# Patient Record
Sex: MALE | Race: WHITE | Employment: OTHER | URBAN - METROPOLITAN AREA
[De-identification: names, ages, dates, MRNs, and addresses within clinical notes are randomized per-mention and may not be internally consistent; named-entity substitution may affect disease eponyms.]

---

## 2021-01-01 ENCOUNTER — HOSPITAL ENCOUNTER (INPATIENT)
Age: 46
LOS: 4 days | DRG: 720 | End: 2021-04-24
Attending: EMERGENCY MEDICINE | Admitting: INTERNAL MEDICINE
Payer: COMMERCIAL

## 2021-01-01 ENCOUNTER — APPOINTMENT (OUTPATIENT)
Dept: CT IMAGING | Age: 46
DRG: 720 | End: 2021-01-01
Payer: COMMERCIAL

## 2021-01-01 ENCOUNTER — APPOINTMENT (OUTPATIENT)
Dept: NUCLEAR MEDICINE | Age: 46
DRG: 720 | End: 2021-01-01
Payer: COMMERCIAL

## 2021-01-01 ENCOUNTER — APPOINTMENT (OUTPATIENT)
Dept: GENERAL RADIOLOGY | Age: 46
DRG: 720 | End: 2021-01-01
Payer: COMMERCIAL

## 2021-01-01 VITALS
SYSTOLIC BLOOD PRESSURE: 117 MMHG | BODY MASS INDEX: 25.22 KG/M2 | TEMPERATURE: 97.5 F | DIASTOLIC BLOOD PRESSURE: 79 MMHG | OXYGEN SATURATION: 96 % | WEIGHT: 202.8 LBS | HEIGHT: 75 IN | RESPIRATION RATE: 18 BRPM | HEART RATE: 78 BPM

## 2021-01-01 DIAGNOSIS — R79.89 ELEVATED D-DIMER: ICD-10-CM

## 2021-01-01 DIAGNOSIS — N17.9 AKI (ACUTE KIDNEY INJURY) (HCC): ICD-10-CM

## 2021-01-01 DIAGNOSIS — J18.9 PNEUMONIA DUE TO ORGANISM: Primary | ICD-10-CM

## 2021-01-01 LAB
ABO/RH: NORMAL
ACINETOBACTER BAUMANNII BY PCR: NOT DETECTED
ALBUMIN SERPL-MCNC: 2 G/DL (ref 3.5–5.2)
ALBUMIN SERPL-MCNC: 2.4 G/DL (ref 3.5–5.2)
ALBUMIN SERPL-MCNC: 2.6 G/DL (ref 3.5–5.2)
ALBUMIN SERPL-MCNC: 2.7 G/DL (ref 3.5–5.2)
ALBUMIN SERPL-MCNC: 2.8 G/DL (ref 3.5–5.2)
ALP BLD-CCNC: 118 U/L (ref 40–129)
ALT SERPL-CCNC: 10 U/L (ref 0–40)
AMMONIA: 24 UMOL/L (ref 16–60)
AMPHETAMINE SCREEN, URINE: NOT DETECTED
ANION GAP SERPL CALCULATED.3IONS-SCNC: 16 MMOL/L (ref 7–16)
ANION GAP SERPL CALCULATED.3IONS-SCNC: 17 MMOL/L (ref 7–16)
ANTIBODY SCREEN: NORMAL
AST SERPL-CCNC: 19 U/L (ref 0–39)
B.E.: -22.9 MMOL/L (ref -3–0)
B.E.: -9.2 MMOL/L (ref -3–3)
BARBITURATE SCREEN URINE: NOT DETECTED
BASOPHILS ABSOLUTE: 0.05 E9/L (ref 0–0.2)
BASOPHILS ABSOLUTE: 0.05 E9/L (ref 0–0.2)
BASOPHILS ABSOLUTE: 0.06 E9/L (ref 0–0.2)
BASOPHILS ABSOLUTE: 0.06 E9/L (ref 0–0.2)
BASOPHILS ABSOLUTE: 0.07 E9/L (ref 0–0.2)
BASOPHILS RELATIVE PERCENT: 0.3 % (ref 0–2)
BASOPHILS RELATIVE PERCENT: 0.3 % (ref 0–2)
BASOPHILS RELATIVE PERCENT: 0.4 % (ref 0–2)
BASOPHILS RELATIVE PERCENT: 0.5 % (ref 0–2)
BASOPHILS RELATIVE PERCENT: 0.5 % (ref 0–2)
BENZODIAZEPINE SCREEN, URINE: NOT DETECTED
BILIRUB SERPL-MCNC: 0.5 MG/DL (ref 0–1.2)
BLOOD BANK DISPENSE STATUS: NORMAL
BLOOD BANK PRODUCT CODE: NORMAL
BOTTLE TYPE: ABNORMAL
BPU ID: NORMAL
BUN BLDV-MCNC: 75 MG/DL (ref 6–20)
BUN BLDV-MCNC: 75 MG/DL (ref 6–20)
BUN BLDV-MCNC: 79 MG/DL (ref 6–20)
BUN BLDV-MCNC: 80 MG/DL (ref 6–20)
BUN BLDV-MCNC: 81 MG/DL (ref 6–20)
BUN BLDV-MCNC: 85 MG/DL (ref 6–20)
BUN BLDV-MCNC: 87 MG/DL (ref 6–20)
BUN BLDV-MCNC: 95 MG/DL (ref 6–20)
CALCIUM SERPL-MCNC: 7.2 MG/DL (ref 8.6–10.2)
CALCIUM SERPL-MCNC: 8.4 MG/DL (ref 8.6–10.2)
CALCIUM SERPL-MCNC: 8.5 MG/DL (ref 8.6–10.2)
CALCIUM SERPL-MCNC: 8.5 MG/DL (ref 8.6–10.2)
CALCIUM SERPL-MCNC: 8.8 MG/DL (ref 8.6–10.2)
CALCIUM SERPL-MCNC: 8.8 MG/DL (ref 8.6–10.2)
CANDIDA ALBICANS BY PCR: NOT DETECTED
CANDIDA GLABRATA BY PCR: NOT DETECTED
CANDIDA KRUSEI BY PCR: NOT DETECTED
CANDIDA PARAPSILOSIS BY PCR: NOT DETECTED
CANDIDA TROPICALIS BY PCR: NOT DETECTED
CANNABINOID SCREEN URINE: NOT DETECTED
CHLORIDE BLD-SCNC: 100 MMOL/L (ref 98–107)
CHLORIDE BLD-SCNC: 101 MMOL/L (ref 98–107)
CHLORIDE BLD-SCNC: 102 MMOL/L (ref 98–107)
CHLORIDE BLD-SCNC: 102 MMOL/L (ref 98–107)
CHLORIDE BLD-SCNC: 103 MMOL/L (ref 98–107)
CHLORIDE BLD-SCNC: 106 MMOL/L (ref 98–107)
CHLORIDE URINE RANDOM: 31 MMOL/L
CHOLESTEROL, TOTAL: 103 MG/DL (ref 0–199)
CO2: 11 MMOL/L (ref 22–29)
CO2: 12 MMOL/L (ref 22–29)
CO2: 12 MMOL/L (ref 22–29)
CO2: 13 MMOL/L (ref 22–29)
CO2: 14 MMOL/L (ref 22–29)
CO2: 15 MMOL/L (ref 22–29)
CO2: 16 MMOL/L (ref 22–29)
CO2: 16 MMOL/L (ref 22–29)
COCAINE METABOLITE SCREEN URINE: NOT DETECTED
COHB: 1.5 % (ref 0–1.5)
CREAT SERPL-MCNC: 4.8 MG/DL (ref 0.7–1.2)
CREAT SERPL-MCNC: 4.8 MG/DL (ref 0.7–1.2)
CREAT SERPL-MCNC: 5.3 MG/DL (ref 0.7–1.2)
CREAT SERPL-MCNC: 5.3 MG/DL (ref 0.7–1.2)
CREAT SERPL-MCNC: 5.6 MG/DL (ref 0.7–1.2)
CREAT SERPL-MCNC: 5.8 MG/DL (ref 0.7–1.2)
CREAT SERPL-MCNC: 5.9 MG/DL (ref 0.7–1.2)
CREAT SERPL-MCNC: 6.5 MG/DL (ref 0.7–1.2)
CREATININE URINE: 88 MG/DL (ref 40–278)
CRITICAL NOTIFICATION: YES
CRITICAL: ABNORMAL
D DIMER: >5250 NG/ML DDU
DATE ANALYZED: ABNORMAL
DATE OF COLLECTION: ABNORMAL
DELIVERY SYSTEMS: ABNORMAL
DESCRIPTION BLOOD BANK: NORMAL
DEVICE: ABNORMAL
EKG ATRIAL RATE: 84 BPM
EKG Q-T INTERVAL: 328 MS
EKG QRS DURATION: 116 MS
EKG QTC CALCULATION (BAZETT): 455 MS
EKG R AXIS: -51 DEGREES
EKG T AXIS: 111 DEGREES
EKG VENTRICULAR RATE: 116 BPM
ENTEROBACTER CLOACAE COMPLEX BY PCR: NOT DETECTED
ENTEROBACTERALES BY PCR: NOT DETECTED
ENTEROCOCCUS BY PCR: NOT DETECTED
EOSINOPHILS ABSOLUTE: 0.14 E9/L (ref 0.05–0.5)
EOSINOPHILS ABSOLUTE: 0.22 E9/L (ref 0.05–0.5)
EOSINOPHILS ABSOLUTE: 0.25 E9/L (ref 0.05–0.5)
EOSINOPHILS ABSOLUTE: 0.28 E9/L (ref 0.05–0.5)
EOSINOPHILS ABSOLUTE: 0.3 E9/L (ref 0.05–0.5)
EOSINOPHILS RELATIVE PERCENT: 0.8 % (ref 0–6)
EOSINOPHILS RELATIVE PERCENT: 1.5 % (ref 0–6)
EOSINOPHILS RELATIVE PERCENT: 1.7 % (ref 0–6)
EOSINOPHILS RELATIVE PERCENT: 2 % (ref 0–6)
EOSINOPHILS RELATIVE PERCENT: 2.3 % (ref 0–6)
ESCHERICHIA COLI BY PCR: NOT DETECTED
FENTANYL SCREEN, URINE: NOT DETECTED
FERRITIN: 1015 NG/ML
FOLATE: 5.1 NG/ML (ref 4.8–24.2)
GFR AFRICAN AMERICAN: 11
GFR AFRICAN AMERICAN: 13
GFR AFRICAN AMERICAN: 14
GFR AFRICAN AMERICAN: 14
GFR AFRICAN AMERICAN: 16
GFR AFRICAN AMERICAN: 16
GFR NON-AFRICAN AMERICAN: 10 ML/MIN/1.73
GFR NON-AFRICAN AMERICAN: 11 ML/MIN/1.73
GFR NON-AFRICAN AMERICAN: 11 ML/MIN/1.73
GFR NON-AFRICAN AMERICAN: 12 ML/MIN/1.73
GFR NON-AFRICAN AMERICAN: 12 ML/MIN/1.73
GFR NON-AFRICAN AMERICAN: 13 ML/MIN/1.73
GFR NON-AFRICAN AMERICAN: 13 ML/MIN/1.73
GFR NON-AFRICAN AMERICAN: 9 ML/MIN/1.73
GLUCOSE BLD-MCNC: 101 MG/DL (ref 74–99)
GLUCOSE BLD-MCNC: 103 MG/DL (ref 74–99)
GLUCOSE BLD-MCNC: 106 MG/DL (ref 74–99)
GLUCOSE BLD-MCNC: 108 MG/DL (ref 74–99)
GLUCOSE BLD-MCNC: 109 MG/DL (ref 74–99)
GLUCOSE BLD-MCNC: 113 MG/DL (ref 74–99)
GLUCOSE BLD-MCNC: 98 MG/DL (ref 74–99)
GLUCOSE BLD-MCNC: 99 MG/DL (ref 74–99)
HAEMOPHILUS INFLUENZAE BY PCR: NOT DETECTED
HBA1C MFR BLD: 5.5 % (ref 4–5.6)
HCO3 ARTERIAL: 8.4 MMOL/L (ref 22–26)
HCO3: 14.3 MMOL/L (ref 22–26)
HCT (EST): 19 % (ref 37–54)
HCT VFR BLD CALC: 20.2 % (ref 37–54)
HCT VFR BLD CALC: 21.4 % (ref 37–54)
HCT VFR BLD CALC: 21.6 % (ref 37–54)
HCT VFR BLD CALC: 22.5 % (ref 37–54)
HCT VFR BLD CALC: 23.9 % (ref 37–54)
HDLC SERPL-MCNC: 22 MG/DL
HEMOGLOBIN: 6.5 G/DL (ref 12.5–16.5)
HEMOGLOBIN: 7 G/DL (ref 12.5–16.5)
HEMOGLOBIN: 7.5 G/DL (ref 12.5–16.5)
HGB, (EST): 6.5 G/DL (ref 12.5–15.5)
HHB: 23.6 % (ref 0–5)
IMMATURE GRANULOCYTES #: 0.12 E9/L
IMMATURE GRANULOCYTES #: 0.14 E9/L
IMMATURE GRANULOCYTES #: 0.15 E9/L
IMMATURE GRANULOCYTES %: 0.8 % (ref 0–5)
IMMATURE GRANULOCYTES %: 0.8 % (ref 0–5)
IMMATURE GRANULOCYTES %: 0.9 % (ref 0–5)
IMMATURE GRANULOCYTES %: 1 % (ref 0–5)
IMMATURE GRANULOCYTES %: 1.1 % (ref 0–5)
IRON SATURATION: 23 % (ref 20–55)
IRON SATURATION: 24 % (ref 20–55)
IRON: 37 MCG/DL (ref 59–158)
IRON: 39 MCG/DL (ref 59–158)
KLEBSIELLA OXYTOCA BY PCR: NOT DETECTED
KLEBSIELLA PNEUMONIAE GROUP BY PCR: NOT DETECTED
L. PNEUMOPHILA SEROGP 1 UR AG: NORMAL
L. PNEUMOPHILA SEROGP 1 UR AG: NORMAL
LAB: ABNORMAL
LACTIC ACID, SEPSIS: 0.6 MMOL/L (ref 0.5–1.9)
LDL CHOLESTEROL CALCULATED: 58 MG/DL (ref 0–99)
LISTERIA MONOCYTOGENES BY PCR: NOT DETECTED
LYMPHOCYTES ABSOLUTE: 1.29 E9/L (ref 1.5–4)
LYMPHOCYTES ABSOLUTE: 1.33 E9/L (ref 1.5–4)
LYMPHOCYTES ABSOLUTE: 1.42 E9/L (ref 1.5–4)
LYMPHOCYTES ABSOLUTE: 1.49 E9/L (ref 1.5–4)
LYMPHOCYTES ABSOLUTE: 1.52 E9/L (ref 1.5–4)
LYMPHOCYTES RELATIVE PERCENT: 10.7 % (ref 20–42)
LYMPHOCYTES RELATIVE PERCENT: 11 % (ref 20–42)
LYMPHOCYTES RELATIVE PERCENT: 8.8 % (ref 20–42)
LYMPHOCYTES RELATIVE PERCENT: 9 % (ref 20–42)
LYMPHOCYTES RELATIVE PERCENT: 9 % (ref 20–42)
Lab: ABNORMAL
Lab: NORMAL
MAGNESIUM: 1.4 MG/DL (ref 1.6–2.6)
MCH RBC QN AUTO: 27.8 PG (ref 26–35)
MCH RBC QN AUTO: 27.9 PG (ref 26–35)
MCH RBC QN AUTO: 28.5 PG (ref 26–35)
MCH RBC QN AUTO: 28.5 PG (ref 26–35)
MCH RBC QN AUTO: 28.8 PG (ref 26–35)
MCHC RBC AUTO-ENTMCNC: 31.1 % (ref 32–34.5)
MCHC RBC AUTO-ENTMCNC: 31.4 % (ref 32–34.5)
MCHC RBC AUTO-ENTMCNC: 32.2 % (ref 32–34.5)
MCHC RBC AUTO-ENTMCNC: 32.4 % (ref 32–34.5)
MCHC RBC AUTO-ENTMCNC: 32.7 % (ref 32–34.5)
MCV RBC AUTO: 86.1 FL (ref 80–99.9)
MCV RBC AUTO: 88.1 FL (ref 80–99.9)
MCV RBC AUTO: 88.5 FL (ref 80–99.9)
MCV RBC AUTO: 88.6 FL (ref 80–99.9)
MCV RBC AUTO: 91.5 FL (ref 80–99.9)
METHADONE SCREEN, URINE: NOT DETECTED
METHB: 0.8 % (ref 0–1.5)
MODE: ABNORMAL
MONOCYTES ABSOLUTE: 0.99 E9/L (ref 0.1–0.95)
MONOCYTES ABSOLUTE: 1.1 E9/L (ref 0.1–0.95)
MONOCYTES ABSOLUTE: 1.11 E9/L (ref 0.1–0.95)
MONOCYTES ABSOLUTE: 1.16 E9/L (ref 0.1–0.95)
MONOCYTES ABSOLUTE: 1.16 E9/L (ref 0.1–0.95)
MONOCYTES RELATIVE PERCENT: 6.8 % (ref 2–12)
MONOCYTES RELATIVE PERCENT: 6.9 % (ref 2–12)
MONOCYTES RELATIVE PERCENT: 7.4 % (ref 2–12)
MONOCYTES RELATIVE PERCENT: 8.2 % (ref 2–12)
MONOCYTES RELATIVE PERCENT: 8.6 % (ref 2–12)
NEISSERIA MENINGITIDIS BY PCR: NOT DETECTED
NEUTROPHILS ABSOLUTE: 11.06 E9/L (ref 1.8–7.3)
NEUTROPHILS ABSOLUTE: 11.6 E9/L (ref 1.8–7.3)
NEUTROPHILS ABSOLUTE: 11.95 E9/L (ref 1.8–7.3)
NEUTROPHILS ABSOLUTE: 14.01 E9/L (ref 1.8–7.3)
NEUTROPHILS ABSOLUTE: 9.92 E9/L (ref 1.8–7.3)
NEUTROPHILS RELATIVE PERCENT: 76.5 % (ref 43–80)
NEUTROPHILS RELATIVE PERCENT: 77.9 % (ref 43–80)
NEUTROPHILS RELATIVE PERCENT: 80.5 % (ref 43–80)
NEUTROPHILS RELATIVE PERCENT: 81.3 % (ref 43–80)
NEUTROPHILS RELATIVE PERCENT: 82.5 % (ref 43–80)
O2 CONTENT: 8 ML/DL
O2 SATURATION: 75.8 % (ref 92–98.5)
O2 SATURATION: 89.4 % (ref 92–98.5)
O2HB: 74.1 % (ref 94–97)
OPERATOR ID: 7991
OPERATOR ID: ABNORMAL
OPIATE SCREEN URINE: NOT DETECTED
ORDER NUMBER: ABNORMAL
ORGANISM: ABNORMAL
ORGANISM: ABNORMAL
OXYCODONE URINE: NOT DETECTED
PATIENT TEMP: 37
PATIENT TEMP: 37 C
PCO2 (TEMP CORRECTED): 45 MMHG (ref 35–45)
PCO2: 23.1 MMHG (ref 35–45)
PDW BLD-RTO: 15 FL (ref 11.5–15)
PDW BLD-RTO: 15.1 FL (ref 11.5–15)
PDW BLD-RTO: 15.3 FL (ref 11.5–15)
PDW BLD-RTO: 15.8 FL (ref 11.5–15)
PDW BLD-RTO: 16 FL (ref 11.5–15)
PH (TEMPERATURE CORRECTED): 6.88 (ref 7.35–7.45)
PH BLOOD GAS: 7.41 (ref 7.35–7.45)
PHENCYCLIDINE SCREEN URINE: NOT DETECTED
PHOSPHORUS: 6.3 MG/DL (ref 2.5–4.5)
PHOSPHORUS: 6.3 MG/DL (ref 2.5–4.5)
PHOSPHORUS: 6.4 MG/DL (ref 2.5–4.5)
PHOSPHORUS: 6.5 MG/DL (ref 2.5–4.5)
PLATELET # BLD: 126 E9/L (ref 130–450)
PLATELET # BLD: 140 E9/L (ref 130–450)
PLATELET # BLD: 141 E9/L (ref 130–450)
PLATELET # BLD: 165 E9/L (ref 130–450)
PLATELET # BLD: 169 E9/L (ref 130–450)
PMV BLD AUTO: 10 FL (ref 7–12)
PMV BLD AUTO: 10.1 FL (ref 7–12)
PMV BLD AUTO: 10.3 FL (ref 7–12)
PMV BLD AUTO: 10.5 FL (ref 7–12)
PMV BLD AUTO: 11.2 FL (ref 7–12)
PO2 (TEMP CORRECTED): 97.1 MMHG (ref 60–100)
PO2: 41.9 MMHG (ref 75–100)
POTASSIUM SERPL-SCNC: 3.8 MMOL/L (ref 3.5–5)
POTASSIUM SERPL-SCNC: 3.9 MMOL/L (ref 3.5–5)
POTASSIUM SERPL-SCNC: 4 MMOL/L (ref 3.5–5)
POTASSIUM SERPL-SCNC: 4.1 MMOL/L (ref 3.5–5)
POTASSIUM SERPL-SCNC: 4.2 MMOL/L (ref 3.5–5)
POTASSIUM, UR: 26.4 MMOL/L
PRO-BNP: ABNORMAL PG/ML (ref 0–125)
PROCALCITONIN: 1.19 NG/ML (ref 0–0.08)
PROTEUS SPECIES BY PCR: NOT DETECTED
PSEUDOMONAS AERUGINOSA BY PCR: NOT DETECTED
RBC # BLD: 2.28 E12/L (ref 3.8–5.8)
RBC # BLD: 2.43 E12/L (ref 3.8–5.8)
RBC # BLD: 2.46 E12/L (ref 3.8–5.8)
RBC # BLD: 2.51 E12/L (ref 3.8–5.8)
RBC # BLD: 2.7 E12/L (ref 3.8–5.8)
SARS-COV-2, NAAT: NOT DETECTED
SERRATIA MARCESCENS BY PCR: NOT DETECTED
SODIUM BLD-SCNC: 129 MMOL/L (ref 132–146)
SODIUM BLD-SCNC: 130 MMOL/L (ref 132–146)
SODIUM BLD-SCNC: 131 MMOL/L (ref 132–146)
SODIUM BLD-SCNC: 131 MMOL/L (ref 132–146)
SODIUM BLD-SCNC: 132 MMOL/L (ref 132–146)
SODIUM BLD-SCNC: 133 MMOL/L (ref 132–146)
SODIUM URINE: 41 MMOL/L
SOURCE OF BLOOD CULTURE: ABNORMAL
SOURCE, BLOOD GAS: ABNORMAL
SOURCE, BLOOD GAS: ABNORMAL
STAPHYLOCOCCUS AUREUS BY PCR: NOT DETECTED
STAPHYLOCOCCUS SPECIES BY PCR: NOT DETECTED
STREP PNEUMONIAE ANTIGEN, URINE: NORMAL
STREP PNEUMONIAE ANTIGEN, URINE: NORMAL
STREPTOCOCCUS AGALACTIAE BY PCR: NOT DETECTED
STREPTOCOCCUS PNEUMONIAE BY PCR: NOT DETECTED
STREPTOCOCCUS PYOGENES  BY PCR: NOT DETECTED
STREPTOCOCCUS SPECIES BY PCR: DETECTED
THB: 7.6 G/DL (ref 11.5–16.5)
TIME ANALYZED: 641
TOTAL IRON BINDING CAPACITY: 163 MCG/DL (ref 250–450)
TOTAL IRON BINDING CAPACITY: 163 MCG/DL (ref 250–450)
TOTAL PROTEIN: 7.1 G/DL (ref 6.4–8.3)
TRIGL SERPL-MCNC: 113 MG/DL (ref 0–149)
TROPONIN: 0.03 NG/ML (ref 0–0.03)
TROPONIN: 0.05 NG/ML (ref 0–0.03)
TROPONIN: 0.05 NG/ML (ref 0–0.03)
TROPONIN: 0.06 NG/ML (ref 0–0.03)
TROPONIN: 0.07 NG/ML (ref 0–0.03)
VITAMIN B-12: 629 PG/ML (ref 211–946)
VLDLC SERPL CALC-MCNC: 23 MG/DL
WBC # BLD: 13 E9/L (ref 4.5–11.5)
WBC # BLD: 14.2 E9/L (ref 4.5–11.5)
WBC # BLD: 14.3 E9/L (ref 4.5–11.5)
WBC # BLD: 14.8 E9/L (ref 4.5–11.5)
WBC # BLD: 17 E9/L (ref 4.5–11.5)

## 2021-01-01 PROCEDURE — 83605 ASSAY OF LACTIC ACID: CPT

## 2021-01-01 PROCEDURE — 6370000000 HC RX 637 (ALT 250 FOR IP): Performed by: INTERNAL MEDICINE

## 2021-01-01 PROCEDURE — 2500000003 HC RX 250 WO HCPCS: Performed by: STUDENT IN AN ORGANIZED HEALTH CARE EDUCATION/TRAINING PROGRAM

## 2021-01-01 PROCEDURE — 86923 COMPATIBILITY TEST ELECTRIC: CPT

## 2021-01-01 PROCEDURE — 2580000003 HC RX 258: Performed by: INTERNAL MEDICINE

## 2021-01-01 PROCEDURE — 99232 SBSQ HOSP IP/OBS MODERATE 35: CPT | Performed by: NURSE PRACTITIONER

## 2021-01-01 PROCEDURE — 6360000002 HC RX W HCPCS: Performed by: INTERNAL MEDICINE

## 2021-01-01 PROCEDURE — 6370000000 HC RX 637 (ALT 250 FOR IP): Performed by: NURSE PRACTITIONER

## 2021-01-01 PROCEDURE — 70450 CT HEAD/BRAIN W/O DYE: CPT

## 2021-01-01 PROCEDURE — 82728 ASSAY OF FERRITIN: CPT

## 2021-01-01 PROCEDURE — 94002 VENT MGMT INPAT INIT DAY: CPT

## 2021-01-01 PROCEDURE — 85025 COMPLETE CBC W/AUTO DIFF WBC: CPT

## 2021-01-01 PROCEDURE — 83036 HEMOGLOBIN GLYCOSYLATED A1C: CPT

## 2021-01-01 PROCEDURE — 94664 DEMO&/EVAL PT USE INHALER: CPT

## 2021-01-01 PROCEDURE — 99284 EMERGENCY DEPT VISIT MOD MDM: CPT

## 2021-01-01 PROCEDURE — 80048 BASIC METABOLIC PNL TOTAL CA: CPT

## 2021-01-01 PROCEDURE — 2060000000 HC ICU INTERMEDIATE R&B

## 2021-01-01 PROCEDURE — 80307 DRUG TEST PRSMV CHEM ANLYZR: CPT

## 2021-01-01 PROCEDURE — 74176 CT ABD & PELVIS W/O CONTRAST: CPT

## 2021-01-01 PROCEDURE — 2580000003 HC RX 258: Performed by: FAMILY MEDICINE

## 2021-01-01 PROCEDURE — 36430 TRANSFUSION BLD/BLD COMPNT: CPT

## 2021-01-01 PROCEDURE — 83550 IRON BINDING TEST: CPT

## 2021-01-01 PROCEDURE — 82805 BLOOD GASES W/O2 SATURATION: CPT

## 2021-01-01 PROCEDURE — 82436 ASSAY OF URINE CHLORIDE: CPT

## 2021-01-01 PROCEDURE — 82803 BLOOD GASES ANY COMBINATION: CPT

## 2021-01-01 PROCEDURE — 36415 COLL VENOUS BLD VENIPUNCTURE: CPT

## 2021-01-01 PROCEDURE — A9539 TC99M PENTETATE: HCPCS | Performed by: RADIOLOGY

## 2021-01-01 PROCEDURE — 86901 BLOOD TYPING SEROLOGIC RH(D): CPT

## 2021-01-01 PROCEDURE — 6370000000 HC RX 637 (ALT 250 FOR IP): Performed by: FAMILY MEDICINE

## 2021-01-01 PROCEDURE — 6360000002 HC RX W HCPCS: Performed by: FAMILY MEDICINE

## 2021-01-01 PROCEDURE — 2140000000 HC CCU INTERMEDIATE R&B

## 2021-01-01 PROCEDURE — 84484 ASSAY OF TROPONIN QUANT: CPT

## 2021-01-01 PROCEDURE — 80069 RENAL FUNCTION PANEL: CPT

## 2021-01-01 PROCEDURE — 83880 ASSAY OF NATRIURETIC PEPTIDE: CPT

## 2021-01-01 PROCEDURE — 99221 1ST HOSP IP/OBS SF/LOW 40: CPT | Performed by: PSYCHIATRY & NEUROLOGY

## 2021-01-01 PROCEDURE — 83735 ASSAY OF MAGNESIUM: CPT

## 2021-01-01 PROCEDURE — 87635 SARS-COV-2 COVID-19 AMP PRB: CPT

## 2021-01-01 PROCEDURE — 2580000003 HC RX 258: Performed by: STUDENT IN AN ORGANIZED HEALTH CARE EDUCATION/TRAINING PROGRAM

## 2021-01-01 PROCEDURE — 82140 ASSAY OF AMMONIA: CPT

## 2021-01-01 PROCEDURE — 87449 NOS EACH ORGANISM AG IA: CPT

## 2021-01-01 PROCEDURE — P9016 RBC LEUKOCYTES REDUCED: HCPCS

## 2021-01-01 PROCEDURE — 93010 ELECTROCARDIOGRAM REPORT: CPT | Performed by: INTERNAL MEDICINE

## 2021-01-01 PROCEDURE — A9540 TC99M MAA: HCPCS | Performed by: RADIOLOGY

## 2021-01-01 PROCEDURE — 6360000002 HC RX W HCPCS: Performed by: STUDENT IN AN ORGANIZED HEALTH CARE EDUCATION/TRAINING PROGRAM

## 2021-01-01 PROCEDURE — 2500000003 HC RX 250 WO HCPCS: Performed by: INTERNAL MEDICINE

## 2021-01-01 PROCEDURE — 87150 DNA/RNA AMPLIFIED PROBE: CPT

## 2021-01-01 PROCEDURE — 2500000003 HC RX 250 WO HCPCS

## 2021-01-01 PROCEDURE — 87077 CULTURE AEROBIC IDENTIFY: CPT

## 2021-01-01 PROCEDURE — 87040 BLOOD CULTURE FOR BACTERIA: CPT

## 2021-01-01 PROCEDURE — 84133 ASSAY OF URINE POTASSIUM: CPT

## 2021-01-01 PROCEDURE — 6360000002 HC RX W HCPCS

## 2021-01-01 PROCEDURE — 78582 LUNG VENTILAT&PERFUS IMAGING: CPT | Performed by: RADIOLOGY

## 2021-01-01 PROCEDURE — 86900 BLOOD TYPING SEROLOGIC ABO: CPT

## 2021-01-01 PROCEDURE — 87186 SC STD MICRODIL/AGAR DIL: CPT

## 2021-01-01 PROCEDURE — 83540 ASSAY OF IRON: CPT

## 2021-01-01 PROCEDURE — 82607 VITAMIN B-12: CPT

## 2021-01-01 PROCEDURE — 86850 RBC ANTIBODY SCREEN: CPT

## 2021-01-01 PROCEDURE — 93005 ELECTROCARDIOGRAM TRACING: CPT | Performed by: STUDENT IN AN ORGANIZED HEALTH CARE EDUCATION/TRAINING PROGRAM

## 2021-01-01 PROCEDURE — 71045 X-RAY EXAM CHEST 1 VIEW: CPT

## 2021-01-01 PROCEDURE — 84300 ASSAY OF URINE SODIUM: CPT

## 2021-01-01 PROCEDURE — 80053 COMPREHEN METABOLIC PANEL: CPT

## 2021-01-01 PROCEDURE — 85378 FIBRIN DEGRADE SEMIQUANT: CPT

## 2021-01-01 PROCEDURE — 78582 LUNG VENTILAT&PERFUS IMAGING: CPT

## 2021-01-01 PROCEDURE — 80061 LIPID PANEL: CPT

## 2021-01-01 PROCEDURE — 82570 ASSAY OF URINE CREATININE: CPT

## 2021-01-01 PROCEDURE — 3430000000 HC RX DIAGNOSTIC RADIOPHARMACEUTICAL: Performed by: RADIOLOGY

## 2021-01-01 PROCEDURE — 2580000003 HC RX 258

## 2021-01-01 PROCEDURE — 82746 ASSAY OF FOLIC ACID SERUM: CPT

## 2021-01-01 PROCEDURE — 84145 PROCALCITONIN (PCT): CPT

## 2021-01-01 PROCEDURE — 71046 X-RAY EXAM CHEST 2 VIEWS: CPT

## 2021-01-01 RX ORDER — LORAZEPAM 1 MG/1
1 TABLET ORAL EVERY 4 HOURS PRN
Status: DISCONTINUED | OUTPATIENT
Start: 2021-01-01 | End: 2021-04-25 | Stop reason: HOSPADM

## 2021-01-01 RX ORDER — ACETAMINOPHEN 325 MG/1
650 TABLET ORAL EVERY 6 HOURS PRN
Status: DISCONTINUED | OUTPATIENT
Start: 2021-01-01 | End: 2021-04-25 | Stop reason: HOSPADM

## 2021-01-01 RX ORDER — LORAZEPAM 2 MG/ML
1 INJECTION INTRAMUSCULAR ONCE
Status: COMPLETED | OUTPATIENT
Start: 2021-01-01 | End: 2021-01-01

## 2021-01-01 RX ORDER — EPINEPHRINE 0.1 MG/ML
SYRINGE (ML) INJECTION
Status: DISCONTINUED
Start: 2021-01-01 | End: 2021-01-01 | Stop reason: WASHOUT

## 2021-01-01 RX ORDER — SODIUM CHLORIDE 9 MG/ML
INJECTION, SOLUTION INTRAVENOUS CONTINUOUS
Status: DISCONTINUED | OUTPATIENT
Start: 2021-01-01 | End: 2021-01-01

## 2021-01-01 RX ORDER — KIT FOR THE PREPARATION OF TECHNETIUM TC 99M PENTETATE 20 MG/1
800 INJECTION, POWDER, LYOPHILIZED, FOR SOLUTION INTRAVENOUS; RESPIRATORY (INHALATION)
Status: COMPLETED | OUTPATIENT
Start: 2021-01-01 | End: 2021-01-01

## 2021-01-01 RX ORDER — SODIUM BICARBONATE 650 MG/1
1300 TABLET ORAL 2 TIMES DAILY
Status: DISCONTINUED | OUTPATIENT
Start: 2021-01-01 | End: 2021-04-25 | Stop reason: HOSPADM

## 2021-01-01 RX ORDER — SODIUM CHLORIDE 9 MG/ML
25 INJECTION, SOLUTION INTRAVENOUS PRN
Status: DISCONTINUED | OUTPATIENT
Start: 2021-01-01 | End: 2021-04-25 | Stop reason: HOSPADM

## 2021-01-01 RX ORDER — SODIUM CHLORIDE 0.9 % (FLUSH) 0.9 %
5-40 SYRINGE (ML) INJECTION PRN
Status: DISCONTINUED | OUTPATIENT
Start: 2021-01-01 | End: 2021-01-01 | Stop reason: SDUPTHER

## 2021-01-01 RX ORDER — GUAIFENESIN 400 MG/1
400 TABLET ORAL 3 TIMES DAILY
Status: DISCONTINUED | OUTPATIENT
Start: 2021-01-01 | End: 2021-04-25 | Stop reason: HOSPADM

## 2021-01-01 RX ORDER — IPRATROPIUM BROMIDE AND ALBUTEROL SULFATE 2.5; .5 MG/3ML; MG/3ML
1 SOLUTION RESPIRATORY (INHALATION)
Status: DISCONTINUED | OUTPATIENT
Start: 2021-01-01 | End: 2021-01-01

## 2021-01-01 RX ORDER — HYDROXYZINE PAMOATE 25 MG/1
25 CAPSULE ORAL 3 TIMES DAILY PRN
Status: DISCONTINUED | OUTPATIENT
Start: 2021-01-01 | End: 2021-04-25 | Stop reason: HOSPADM

## 2021-01-01 RX ORDER — LORAZEPAM 0.5 MG/1
0.5 TABLET ORAL EVERY 4 HOURS PRN
Status: DISCONTINUED | OUTPATIENT
Start: 2021-01-01 | End: 2021-01-01

## 2021-01-01 RX ORDER — LIDOCAINE HYDROCHLORIDE 10 MG/ML
5 INJECTION, SOLUTION EPIDURAL; INFILTRATION; INTRACAUDAL; PERINEURAL ONCE
Status: DISCONTINUED | OUTPATIENT
Start: 2021-01-01 | End: 2021-04-25 | Stop reason: HOSPADM

## 2021-01-01 RX ORDER — SODIUM CHLORIDE 9 MG/ML
INJECTION, SOLUTION INTRAVENOUS PRN
Status: DISCONTINUED | OUTPATIENT
Start: 2021-01-01 | End: 2021-04-25 | Stop reason: HOSPADM

## 2021-01-01 RX ORDER — SODIUM CHLORIDE 0.9 % (FLUSH) 0.9 %
5-40 SYRINGE (ML) INJECTION PRN
Status: DISCONTINUED | OUTPATIENT
Start: 2021-01-01 | End: 2021-04-25 | Stop reason: HOSPADM

## 2021-01-01 RX ORDER — SODIUM CHLORIDE 0.9 % (FLUSH) 0.9 %
5-40 SYRINGE (ML) INJECTION EVERY 12 HOURS SCHEDULED
Status: DISCONTINUED | OUTPATIENT
Start: 2021-01-01 | End: 2021-04-25 | Stop reason: HOSPADM

## 2021-01-01 RX ORDER — SODIUM CHLORIDE 0.9 % (FLUSH) 0.9 %
5-40 SYRINGE (ML) INJECTION EVERY 12 HOURS SCHEDULED
Status: DISCONTINUED | OUTPATIENT
Start: 2021-01-01 | End: 2021-01-01 | Stop reason: SDUPTHER

## 2021-01-01 RX ADMIN — SODIUM BICARBONATE 1300 MG: 650 TABLET ORAL at 08:27

## 2021-01-01 RX ADMIN — SODIUM CHLORIDE: 9 INJECTION, SOLUTION INTRAVENOUS at 04:27

## 2021-01-01 RX ADMIN — CEFTRIAXONE SODIUM 1000 MG: 1 INJECTION, POWDER, FOR SOLUTION INTRAMUSCULAR; INTRAVENOUS at 16:44

## 2021-01-01 RX ADMIN — SODIUM CHLORIDE 100 ML/HR: 9 INJECTION, SOLUTION INTRAVENOUS at 23:24

## 2021-01-01 RX ADMIN — DOXYCYCLINE 100 MG: 100 INJECTION, POWDER, LYOPHILIZED, FOR SOLUTION INTRAVENOUS at 17:12

## 2021-01-01 RX ADMIN — ACETAMINOPHEN 650 MG: 325 TABLET ORAL at 19:36

## 2021-01-01 RX ADMIN — SODIUM BICARBONATE 1300 MG: 650 TABLET ORAL at 09:17

## 2021-01-01 RX ADMIN — SODIUM CHLORIDE, PRESERVATIVE FREE 10 ML: 5 INJECTION INTRAVENOUS at 21:09

## 2021-01-01 RX ADMIN — HYDROXYZINE PAMOATE 25 MG: 25 CAPSULE ORAL at 17:20

## 2021-01-01 RX ADMIN — SODIUM BICARBONATE 1300 MG: 650 TABLET ORAL at 21:40

## 2021-01-01 RX ADMIN — SODIUM CHLORIDE: 9 INJECTION, SOLUTION INTRAVENOUS at 14:13

## 2021-01-01 RX ADMIN — LORAZEPAM 0.5 MG: 0.5 TABLET ORAL at 02:05

## 2021-01-01 RX ADMIN — CEFTRIAXONE SODIUM 1000 MG: 1 INJECTION, POWDER, FOR SOLUTION INTRAMUSCULAR; INTRAVENOUS at 18:19

## 2021-01-01 RX ADMIN — SODIUM BICARBONATE 1300 MG: 650 TABLET ORAL at 08:44

## 2021-01-01 RX ADMIN — KIT FOR THE PREPARATION OF TECHNETIUM TC 99M PENTETATE 800 MICRO CURIE: 20 INJECTION, POWDER, LYOPHILIZED, FOR SOLUTION INTRAVENOUS; RESPIRATORY (INHALATION) at 16:14

## 2021-01-01 RX ADMIN — Medication 6.3 MILLICURIE: at 16:34

## 2021-01-01 RX ADMIN — SODIUM CHLORIDE, PRESERVATIVE FREE 10 ML: 5 INJECTION INTRAVENOUS at 08:27

## 2021-01-01 RX ADMIN — LORAZEPAM 1 MG: 2 INJECTION INTRAMUSCULAR; INTRAVENOUS at 14:21

## 2021-01-01 RX ADMIN — LORAZEPAM 0.5 MG: 0.5 TABLET ORAL at 21:47

## 2021-01-01 RX ADMIN — DAPTOMYCIN 500 MG: 500 INJECTION, POWDER, LYOPHILIZED, FOR SOLUTION INTRAVENOUS at 15:54

## 2021-01-01 RX ADMIN — CEFTRIAXONE SODIUM 1000 MG: 1 INJECTION, POWDER, FOR SOLUTION INTRAMUSCULAR; INTRAVENOUS at 17:11

## 2021-01-01 RX ADMIN — GUAIFENESIN 400 MG: 400 TABLET, FILM COATED ORAL at 16:44

## 2021-01-01 RX ADMIN — IPRATROPIUM BROMIDE AND ALBUTEROL SULFATE 1 AMPULE: .5; 3 SOLUTION RESPIRATORY (INHALATION) at 06:56

## 2021-01-01 RX ADMIN — SODIUM BICARBONATE 1300 MG: 650 TABLET ORAL at 21:09

## 2021-01-01 RX ADMIN — CEFTRIAXONE 1000 MG: 1 INJECTION, POWDER, FOR SOLUTION INTRAMUSCULAR; INTRAVENOUS at 18:12

## 2021-01-01 RX ADMIN — SODIUM BICARBONATE 1300 MG: 650 TABLET ORAL at 17:10

## 2021-01-01 RX ADMIN — DAPTOMYCIN 500 MG: 500 INJECTION, POWDER, LYOPHILIZED, FOR SOLUTION INTRAVENOUS at 16:44

## 2021-01-01 RX ADMIN — SODIUM CHLORIDE, PRESERVATIVE FREE 10 ML: 5 INJECTION INTRAVENOUS at 21:19

## 2021-01-01 RX ADMIN — SODIUM BICARBONATE: 84 INJECTION, SOLUTION INTRAVENOUS at 01:45

## 2021-01-01 RX ADMIN — DOXYCYCLINE 100 MG: 100 INJECTION, POWDER, LYOPHILIZED, FOR SOLUTION INTRAVENOUS at 18:13

## 2021-01-01 RX ADMIN — DOXYCYCLINE 100 MG: 100 INJECTION, POWDER, LYOPHILIZED, FOR SOLUTION INTRAVENOUS at 04:41

## 2021-01-01 RX ADMIN — GUAIFENESIN 400 MG: 400 TABLET, FILM COATED ORAL at 11:19

## 2021-01-01 RX ADMIN — SODIUM CHLORIDE: 9 INJECTION, SOLUTION INTRAVENOUS at 21:40

## 2021-01-01 RX ADMIN — GUAIFENESIN 400 MG: 400 TABLET, FILM COATED ORAL at 22:49

## 2021-01-01 RX ADMIN — SODIUM BICARBONATE 1300 MG: 650 TABLET ORAL at 22:49

## 2021-01-01 RX ADMIN — GUAIFENESIN 400 MG: 400 TABLET, FILM COATED ORAL at 09:17

## 2021-01-01 RX ADMIN — DOXYCYCLINE 100 MG: 100 INJECTION, POWDER, LYOPHILIZED, FOR SOLUTION INTRAVENOUS at 05:04

## 2021-01-01 RX ADMIN — EPOETIN ALFA-EPBX 3000 UNITS: 3000 INJECTION, SOLUTION INTRAVENOUS; SUBCUTANEOUS at 08:27

## 2021-01-01 RX ADMIN — HYDROXYZINE PAMOATE 25 MG: 25 CAPSULE ORAL at 11:19

## 2021-01-01 RX ADMIN — SODIUM BICARBONATE: 84 INJECTION, SOLUTION INTRAVENOUS at 13:00

## 2021-01-01 ASSESSMENT — PAIN SCALES - GENERAL
PAINLEVEL_OUTOF10: 0
PAINLEVEL_OUTOF10: 7

## 2021-01-01 ASSESSMENT — ENCOUNTER SYMPTOMS
VOMITING: 1
CHEST TIGHTNESS: 0
ABDOMINAL PAIN: 0
SHORTNESS OF BREATH: 1
WHEEZING: 0
EYE REDNESS: 0
COUGH: 0
PHOTOPHOBIA: 0
NAUSEA: 1
RHINORRHEA: 0
SORE THROAT: 0

## 2021-01-01 ASSESSMENT — PAIN DESCRIPTION - PAIN TYPE: TYPE: ACUTE PAIN

## 2021-01-01 ASSESSMENT — PAIN DESCRIPTION - LOCATION
LOCATION: CHEST
LOCATION: RIB CAGE

## 2021-04-20 PROBLEM — J18.9 PNEUMONIA: Status: ACTIVE | Noted: 2021-01-01

## 2021-04-20 NOTE — ED PROVIDER NOTES
Patient is a 79-year-old male with a history of multisubstance abuse, hepatic cirrhosis, MI, pacemaker placement, mitral valve surgery that presents emergency department for evaluation of chest pain. Patient states that the pain started 2 days ago on the right side of the chest.  It is a sharp stabbing sensation worse with deep inspiration. Nothing seems to make it better. He was given aspirin in route to the hospital with no improvement of pain. It has been intermittent and severe. Admits to mild shortness of breath. Denies fever, chills, cough, congestion, sore throat, abdominal pain, change in bowel habits, change in urinary habits. Denies any recent surgeries, long distance travel. He has never been treated for or diagnosed with cancer in the past.  No history of blood clots in legs or lungs. The history is provided by the patient. Review of Systems   Constitutional: Positive for appetite change, chills and fatigue. Negative for activity change and fever. HENT: Negative for congestion, rhinorrhea and sore throat. Eyes: Negative for photophobia, redness and visual disturbance. Respiratory: Positive for shortness of breath. Negative for cough, chest tightness and wheezing. Cardiovascular: Positive for chest pain. Negative for palpitations and leg swelling. Gastrointestinal: Positive for nausea and vomiting. Negative for abdominal pain. Genitourinary: Negative for decreased urine volume, dysuria, frequency and urgency. Musculoskeletal: Negative for myalgias, neck pain and neck stiffness. Skin: Negative for pallor and rash. Neurological: Positive for light-headedness. Negative for dizziness, weakness and headaches. Physical Exam  Constitutional:       General: He is not in acute distress. Appearance: Normal appearance. He is ill-appearing. He is not diaphoretic. HENT:      Head: Normocephalic and atraumatic.       Mouth/Throat:      Mouth: Mucous membranes are moist.   Eyes:      Extraocular Movements: Extraocular movements intact. Pupils: Pupils are equal, round, and reactive to light. Cardiovascular:      Rate and Rhythm: Regular rhythm. Tachycardia present. Pulses: Normal pulses. Heart sounds: Murmur present. Pulmonary:      Effort: Pulmonary effort is normal.      Breath sounds: Normal breath sounds. No wheezing or rhonchi. Chest:      Chest wall: Tenderness present. Comments: Midline surgical scar appears well-healed without any erythema or induration. Abdominal:      Palpations: Abdomen is soft. Tenderness: There is no abdominal tenderness. There is no guarding or rebound. Lymphadenopathy:      Cervical: No cervical adenopathy. Skin:     General: Skin is warm and dry. Neurological:      Mental Status: He is alert and oriented to person, place, and time. Procedures     MDM  Number of Diagnoses or Management Options  HEATHER (acute kidney injury) ([facility] [address]  Elevated d-dimer  Pneumonia due to organism  Diagnosis management comments: Patient is a 67-year-old male that presents emergency room for evaluation of chest pain. Patient mildly diaphoretic on presentation and uncomfortable however in no obvious distress. Vital signs are stable. Chest x-ray shows a right-sided pneumonia. Blood work remarkable for markedly elevated D-dimer and a BNP of 15,000. Patient's oxygen saturation normal on room air and he is not tachycardic given patient's acute kidney injury he is not a candidate to get a CTA of the chest at this time. V/Q scan ordered. Discussed case with hospitalist who will follow up on the V/Q scan. We will hold off on anticoagulation at this time as patient has a history of HIT. Patient given Rocephin and doxycycline for community-acquired pneumonia. Admitted in stable condition.        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 Past Surgical History:  has a past surgical history that includes ECHO Compl W Dop Color Flow (3/15/2013); ECHO Transesophageal (3/18/2013); ECHO Compl W Dop Color Flow (7/3/2013); ECHO Compl W Dop Color Flow (1/20/2014); ECHO Transesophageal (1/21/2014); and Cardiac surgery. Social History:  reports that he has been smoking cigarettes. He has been smoking about 1.00 pack per day. He has quit using smokeless tobacco. He reports current drug use. Drug: Marijuana. He reports that he does not drink alcohol. Family History: family history is not on file. The patients home medications have been reviewed.     Allergies: Coumadin [warfarin] and Heparin    -------------------------------------------------- RESULTS -------------------------------------------------    LABS:  Results for orders placed or performed during the hospital encounter of 04/20/21   COVID-19, Rapid    Specimen: Nasopharyngeal Swab   Result Value Ref Range    SARS-CoV-2, NAAT Not Detected Not Detected   CBC Auto Differential   Result Value Ref Range    WBC 17.0 (H) 4.5 - 11.5 E9/L    RBC 2.70 (L) 3.80 - 5.80 E12/L    Hemoglobin 7.5 (L) 12.5 - 16.5 g/dL    Hematocrit 23.9 (L) 37.0 - 54.0 %    MCV 88.5 80.0 - 99.9 fL    MCH 27.8 26.0 - 35.0 pg    MCHC 31.4 (L) 32.0 - 34.5 %    RDW 15.1 (H) 11.5 - 15.0 fL    Platelets 539 (L) 314 - 450 E9/L    MPV 11.2 7.0 - 12.0 fL    Neutrophils % 82.5 (H) 43.0 - 80.0 %    Immature Granulocytes % 0.8 0.0 - 5.0 %    Lymphocytes % 8.8 (L) 20.0 - 42.0 %    Monocytes % 6.8 2.0 - 12.0 %    Eosinophils % 0.8 0.0 - 6.0 %    Basophils % 0.3 0.0 - 2.0 %    Neutrophils Absolute 14.01 (H) 1.80 - 7.30 E9/L    Immature Granulocytes # 0.14 E9/L    Lymphocytes Absolute 1.49 (L) 1.50 - 4.00 E9/L    Monocytes Absolute 1.16 (H) 0.10 - 0.95 E9/L    Eosinophils Absolute 0.14 0.05 - 0.50 E9/L    Basophils Absolute 0.05 0.00 - 0.20 E9/L   Comprehensive Metabolic Panel   Result Value Ref Range    Sodium 133 132 - 146 mmol/L    Potassium 4.2 3.5 - 5.0 mmol/L    Chloride 106 98 - 107 mmol/L    CO2 11 (L) 22 - 29 mmol/L    Anion Gap 16 7 - 16 mmol/L    Glucose 98 74 - 99 mg/dL    BUN 80 (H) 6 - 20 mg/dL    CREATININE 5.6 (H) 0.7 - 1.2 mg/dL    GFR Non-African American 11 >=60 mL/min/1.73    GFR African American 13     Calcium 7.2 (L) 8.6 - 10.2 mg/dL    Total Protein 7.1 6.4 - 8.3 g/dL    Albumin 2.0 (L) 3.5 - 5.2 g/dL    Total Bilirubin 0.5 0.0 - 1.2 mg/dL    Alkaline Phosphatase 118 40 - 129 U/L    ALT 10 0 - 40 U/L    AST 19 0 - 39 U/L   Troponin   Result Value Ref Range    Troponin 0.03 0.00 - 0.03 ng/mL   D-Dimer, Quantitative   Result Value Ref Range    D-Dimer, Quant >5250 ng/mL DDU   Brain Natriuretic Peptide   Result Value Ref Range    Pro-BNP 15,608 (H) 0 - 125 pg/mL   Lactate, Sepsis   Result Value Ref Range    Lactic Acid, Sepsis 0.6 0.5 - 1.9 mmol/L   EKG 12 Lead   Result Value Ref Range    Ventricular Rate 116 BPM    Atrial Rate 84 BPM    QRS Duration 116 ms    Q-T Interval 328 ms    QTc Calculation (Bazett) 455 ms    R Axis -51 degrees    T Axis 111 degrees       RADIOLOGY:  XR CHEST PORTABLE   Final Result   Right lung pneumonia. Mild bilateral pleural effusion. NM LUNG VENT/PERFUSION (VQ)    (Results Pending)       EKG: This EKG is signed and interpreted by me.     Rate: 116  Rhythm: Sinus  Interpretation: Sinus tachycardia with a left axis deviation and incomplete left bundle branch block, QTc of 455  Comparison: stable as compared to patient's most recent EKG      ------------------------- NURSING NOTES AND VITALS REVIEWED ---------------------------  Date / Time Roomed:  4/20/2021  3:22 PM  ED Bed Assignment:  [id number]    The nursing notes within the ED encounter and vital signs as below have been reviewed. Patient Vitals for the past 24 hrs:   BP Temp Temp src Pulse Resp SpO2 Height Weight   04/20/21 2001 (!) 102/57 97.6 °F (36.4 °C) -- 80 -- 99 % 6' 3\" (1.905 m) 202 lb 12.8 oz (92 kg)   04/20/21 1834 99/69 -- -- 78 18 96 % -- --   04/20/21 1815 97/66 -- -- 74 20 97 % -- --   04/20/21 1528 97/63 97.4 °F (36.3 °C) Infrared 71 20 99 % 6' 3\" (1.905 m) 245 lb (111.1 kg)       Oxygen Saturation Interpretation: Normal    ------------------------------------------ PROGRESS NOTES ------------------------------------------  Re-evaluation(s):  Time: 9009  Patients symptoms show no change  Repeat physical examination is not changed    Counseling:  I have spoken with the patient and discussed todays results, in addition to providing specific details for the plan of care and counseling regarding the diagnosis and prognosis. Their questions are answered at this time and they are agreeable with the plan of admission.    --------------------------------- ADDITIONAL PROVIDER NOTES ---------------------------------  Consultations:  Spoke with Dr. Jaylen Alcaraz. Discussed case. They will admit the patient. This patient's ED course included: a personal history and physicial examination, re-evaluation prior to disposition, IV medications, cardiac monitoring and continuous pulse oximetry    This patient has remained hemodynamically stable during their ED course. Diagnosis:  1. Pneumonia due to organism    2. HEATHER (acute kidney injury) ([facility] [address]    3. Elevated d-dimer        Disposition:  Patient's disposition: Admit to telemetry  Patient's condition is stable.               Moe Luna., DO  Resident  04/20/21 5899

## 2021-04-20 NOTE — ED NOTES
Bed: 09  Expected date:   Expected time:   Means of arrival:   Comments:  emt     Jenniffer Solorio RN  04/20/21 9085

## 2021-04-20 NOTE — ED NOTES
Spoke with Dr Darryl Gavin ok to have VQ tomorrow since nucmed has left for day     Castro Cobb RN  04/20/21 0067

## 2021-04-21 PROBLEM — F19.10 POLYSUBSTANCE ABUSE (HCC): Status: ACTIVE | Noted: 2021-01-01

## 2021-04-21 PROBLEM — I95.9 HYPOTENSION: Status: ACTIVE | Noted: 2021-01-01

## 2021-04-21 PROBLEM — I38 VALVULAR HEART DISEASE: Status: ACTIVE | Noted: 2021-01-01

## 2021-04-21 PROBLEM — R79.89 ELEVATED D-DIMER: Status: ACTIVE | Noted: 2021-01-01

## 2021-04-21 PROBLEM — Z86.79 HISTORY OF ENDOCARDITIS: Status: ACTIVE | Noted: 2021-01-01

## 2021-04-21 PROBLEM — F17.200 TOBACCO DEPENDENCE: Status: ACTIVE | Noted: 2021-01-01

## 2021-04-21 PROBLEM — R29.898 LEFT ARM WEAKNESS: Status: ACTIVE | Noted: 2021-01-01

## 2021-04-21 PROBLEM — E87.20 METABOLIC ACIDOSIS: Status: ACTIVE | Noted: 2021-01-01

## 2021-04-21 PROBLEM — D69.6 THROMBOCYTOPENIA (HCC): Status: ACTIVE | Noted: 2021-01-01

## 2021-04-21 PROBLEM — R23.3 PETECHIAL RASH: Status: ACTIVE | Noted: 2021-01-01

## 2021-04-21 PROBLEM — M79.10 MYALGIA: Status: ACTIVE | Noted: 2021-01-01

## 2021-04-21 PROBLEM — Z78.9 PROBLEM WITH VASCULAR ACCESS: Status: ACTIVE | Noted: 2021-01-01

## 2021-04-21 PROBLEM — E83.51 HYPOCALCEMIA: Status: ACTIVE | Noted: 2021-01-01

## 2021-04-21 PROBLEM — D64.9 ANEMIA: Status: ACTIVE | Noted: 2021-01-01

## 2021-04-21 PROBLEM — Z87.898 HISTORY OF SEIZURE: Status: ACTIVE | Noted: 2021-01-01

## 2021-04-21 NOTE — CONSULTS
Reason for consultation: Left upper extremity weakness    HPI: Patient is a 79-year-old male with past medical history of CVA (right parietal, left corpus callosum, left cerebellar hemisphere in 2013 due to infective endocarditis), CAD/MI, infective endocarditis s/p MVR, pacemaker implantation, liver cirrhosis (due to HBV infection), IV drug abuse, HIT. Patient presented to ED 4/20/2021 with complaints of pleuritic chest pain, shortness of breath. He also complains of left upper extremity weakness over the past 2 days. He denies any numbness or tingling of the extremity, blurry vision, loss of vision, diplopia, dysarthria, gait instability or seizure activity. He does complain of bilateral frontal headache, which began earlier this morning. On arrival to ED, patient was hemodynamically stable, afebrile. Labs significant for acidosis with bicarb of 15, uremia with BUN of 95, elevated creatinine 6.5, leukocytosis with WBCs 17,000, anemia with hemoglobin 7.5, thrombocytopenia platelet count 386C. CT of the head demonstrating questionable left parietal SAH, however was negative for acute infarct or mass. ROS: Full ROS reviewed and negative except as mentioned above.     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 ([facility] [address] 1/5/2016    Heroin abuse ([facility] [address]     clean since 2012    MI (myocardial infarction) ([address]     Seizures ([facility] [address]     Substance abuse ([facility] [address]     Unspecified cerebral artery occlusion with cerebral infarction        PAST SURGICAL HISTORY:      Procedure Laterality Date    CARDIAC SURGERY      ECHO COMPL W DOP COLOR FLOW  3/15/2013         ECHO COMPL W DOP COLOR FLOW  7/3/2013         ECHO COMPL W DOP COLOR FLOW  1/20/2014         ECHOCARDIOGRAM TRANSESOPHAGEAL  3/18/2013         ECHOCARDIOGRAM TRANSESOPHAGEAL  1/21/2014            ALLERGIES:  Coumadin [warfarin] and Heparin      Home Medications Reviewed:         PHYSICAL EXAMINATION  Vitals   Vitals:    04/20/21 1815 04/20/21 1834 04/20/21 2001 04/21/21 0245   BP: 97/66 99/69 (!) 102/57 (!) 120/58   Pulse: 74 78 80 78   Resp: 20 18 18 16   Temp:   97.6 °F (36.4 °C) 98.8 °F (37.1 °C)   TempSrc:   Oral Temporal   SpO2: 97% 96% 99%    Weight:   202 lb 12.8 oz (92 kg)    Height:   6' 3\" (1.905 m)         General: Patient appears in no acute distress with a normal body habitus  HEENT: Normocephalic, atraumatic  Chest: Clear to auscultation bilaterally  Heart: Regular rate and rhythm, no murmurs appreciated  Extremities: No edema or cyanosis noted  Integument: Diffuse petechial rash noted    Neurologic Examination    Mental Status  Alert, and oriented to person, place and time with normal speech and language. No evidence of aphasia during conversation. No evidence of memory impairment. Attention and concentration appeared normal.     Cranial Nerves  II. Visual fields full to confrontation bilaterally. III, IV, VI: Pupils equally round and reactive to light, 3 to 2 mm bilaterally. EOMs: full, no nystagmus. V. Facial sensation intact to light touch bilaterally  VII: Facial movements symmetric and strong  VIII: Hearing intact to voice  IX,X: Palate elevates symmetrically. No dysarthria  XI: Sternocleidomastoid and trapezius 5/5 bilaterally   XII: Tongue is midline    Motor     Right Left   Right Left   Deltoid 5 5  Hip Flexion 5 5   Biceps      5  5  Knee Extension 5 5   Triceps 5 5  Knee Flexion 5 5   Handgrip 5 4-  Ankle Dorsiflexion 5 5       Ankle Plantarflexion 5 5     Tone: Normal in all four limbs    Bulk: Normal in all four limbs with no evidence of atrophy    Drift noted left upper extremity. Sensation  · Light Touch: Intact distally in all four limbs    Reflexes     Right Left   Biceps 2 2   Brachioradialis 2 2   Triceps 2 2   Patellar 2 2   Achilles 2 2   ankle clonus none none     Toes down going bilaterally.     Coordination  Rapid alternating movements normal in bilateral upper extremities  Finger to nose testing normal bilaterally  Heel to shin testing normal bilaterally      IMAGING:  CT ABDOMEN PELVIS WO CONTRAST Additional Contrast? None   Final Result   Limited evaluation without IV contrast.      Gallstone in the gallbladder with pericholecystic fat stranding and   gallbladder wall thickening may indicate acute cholecystitis. Other etiology   may include edema or infiltrative hepatic disease. Please correlate physical   exam.  Further evaluation may be obtained with HIDA scan there is concern   cholecystitis. No renal or ureteral calculus. Small bilateral pleural effusions with stable infiltrates, which may indicate   a chronic process such as scarring. CT HEAD WO CONTRAST   Final Result   Focal hyperattenuation in left parietal lobe may represent tiny subarachnoid   hemorrhage. No evidence of acute infarction. Encephalomalacia seen in right parietal lobe. XR CHEST PORTABLE   Final Result   Right lung pneumonia. Mild bilateral pleural effusion. NM LUNG VENT/PERFUSION (VQ)    (Results Pending)          ASSESSMENT:    58-year-old male with remote history of CVA due to infective endocarditis presenting with new focal neurologic deficit of the LUE concerning for recurrent thromboembolic event; questionable left parietal subarachnoid hemorrhage noted on CT head. PLAN:    · Obtain MRI brain if pacemaker compatible, otherwise recommend serial head CTs  · Would also consider LP if unable to obtain MRI to assess for xanthochromia, RBCs in CSF  · MRA head and neck ordered, defer CTA given renal dysfunction  · Echocardiogram to assess valves and R/O thrombus  · Consult neurosurgery for possible SAH  · Defer antiplatelet at this time given possible SAH and platelet dysfunction in setting of uremia, thrombocytopenia      Case discussed on rounds with Dr. Vicente Luna. Electronically signed by:  Estefanía Kelly Orozco MD, 4/21/2021 10:41 AM

## 2021-04-21 NOTE — CARE COORDINATION
4/21/2-021 - Care coordination - pt off unit at present time for testing.  SW/CM will follow up with pt in am.

## 2021-04-21 NOTE — PROGRESS NOTES
Hospitalist Progress Note      PCP: Renaldo Fong DO    Date of Admission: 4/20/2021  Days in the hospital: 1    Hospital Course:   14-year-old male patient with multiple medical problems including history of substance abuse, hepatitis B, liver cirrhosis, MRSA endocarditis, history of CVA, CAD who comes in with vague symptoms including some shortness of breath, nausea and vomiting and generalized feeling of not feeling well. He was noted to have pneumonia and sepsis at the time of admission started on empiric antibiotics. Chest x-ray did show right middle and lower lung consolidation. Patient also was noted to have acute kidney injury with creatinine of 6.5. Overnight patient had some left-sided weakness noted and CT of the head was done which showed left parietal lobe tiny subarachnoid hemorrhage. Consultation was placed to neurology. Patient also was noted to have pancytopenia. Subjective  Patient seen and examined at bedside. Denies any headache or dizziness. No change in vision noted. Complaining of decreased left hand . Awaiting neurology and nephrology evaluation. Exam:    BP (!) 120/58   Pulse 78   Temp 98.8 °F (37.1 °C) (Temporal)   Resp 16   Ht 6' 3\" (1.905 m)   Wt 202 lb 12.8 oz (92 kg)   SpO2 99%   BMI 25.35 kg/m²     HEENT: + Pallor, no icterus. Respiratory:  CTA, good air entry. Cardiovascular: RRR, no murmur. Abdomen: Soft, non-tender, BS noted. Musculoskeletal: No joint pains or joint swelling noted.    Neurologic: awake, alert and following commands       Assessment/Plan:  Active Hospital Problems    Diagnosis Date Noted    Polysubstance abuse [facility]) [F19.10] 04/21/2021    History of endocarditis [Z86.79] 04/21/2021    History of seizure [Z87.898] 04/21/2021    Tobacco dependence [F17.200] 04/21/2021    Myalgia [M79.10] 24/56/4589    Metabolic acidosis [R05.4] 04/21/2021    Thrombocytopenia (HCC) [D69.6] 04/21/2021    Petechial rash [R23.3] 04/21/2021  Anemia [D64.9] 04/21/2021    Problem with vascular access [Z78.9] 04/21/2021    Hypocalcemia [E83.51] 04/21/2021    Elevated d-dimer [R79.89] 04/21/2021    Valvular heart disease [I38] 04/21/2021    Hypotension [I95.9] 04/21/2021    Left arm weakness [R29.898] 04/21/2021    Pneumonia [J18.9] 04/20/2021    Hepatic cirrhosis due to hepatitis B ([facility] [address] [K74.60, B19.10] 01/05/2016    Acute kidney injury superimposed on CKD ([facility] [address] [N17.9, N18.9] 04/19/2014    Sepsis ([facility] [A41.9] 04/19/2014     · Subarachnoid hemorrhage    Plan:  · Continue empiric antibiotics for now, follow cultures  · Renal function worse today, follow-up with nephrology, patient having urine output, continue to monitor closely  · Neurology consulted, noted to have small subarachnoid hemorrhage  · Acute on chronic anemia, monitor him, may need transfusion  · Platelets improved today, continue to monitor labs  · Overall prognosis remains guarded      Labs:   Recent Labs     04/20/21  1606 04/21/21  0349   WBC 17.0* 14.3*   HGB 7.5* 7.0*   HCT 23.9* 21.6*   * 140     Recent Labs     04/20/21  1606 04/21/21  0349    131*   K 4.2 3.8    100   CO2 11* 15*   BUN 80* 95*   CREATININE 5.6* 6.5*   CALCIUM 7.2* 8.5*   PHOS  --  6.5*     Recent Labs     04/20/21  1606   AST 19   ALT 10   BILITOT 0.5   ALKPHOS 118     No results for input(s): INR in the last 72 hours.   Recent Labs     04/20/21  1606 04/21/21  0349   TROPONINI 0.03 0.05*       Medications:  Reviewed    Infusion Medications    sodium chloride      sodium chloride 125 mL/hr (04/21/21 0528)     Scheduled Medications    lidocaine PF  5 mL Intradermal Once    sodium chloride flush  5-40 mL Intravenous 2 times per day    sodium chloride flush  5-40 mL Intravenous 2 times per day    doxycycline (VIBRAMYCIN) IV  100 mg Intravenous Q12H    cefTRIAXone (ROCEPHIN) IV  1,000 mg Intravenous Q24H     PRN Meds: sodium chloride flush, sodium chloride, sodium chloride flush      Intake/Output Summary (Last 24 hours) at 4/21/2021 0906  Last data filed at 4/20/2021 2111  Gross per 24 hour   Intake 240 ml   Output 300 ml   Net -60 ml     · Body mass index is 25.35 kg/m². · Diet  · No diet orders on file    · Code Status  · Prior       Electronically signed by Anam Clifford MD on 4/21/2021 at 9:06 AM  [employer]   Please contact me through perfect [employer]    NOTE: This report was transcribed using voice recognition software. Every effort was made to ensure accuracy; however, inadvertent computerized transcription errors may be present.

## 2021-04-21 NOTE — PATIENT CARE CONFERENCE
P Quality Flow/Interdisciplinary Rounds Progress Note        Quality Flow Rounds held on April 21, 2021    Disciplines Attending:  Bedside Nurse, ,  and Nursing Unit Leadership    Naye Mata was admitted on 4/20/2021  3:22 PM    Anticipated Discharge Date:  Expected Discharge Date: 04/23/21    Disposition:    Domingo Score:  Domingo Scale Score: 23    Readmission Risk              Risk of Unplanned Readmission:        21           Discussed patient goal for the day, patient clinical progression, and barriers to discharge.   The following Goal(s) of the Day/Commitment(s) have been identified:  labs report results      Joselin Roger  April 21, 2021

## 2021-04-21 NOTE — CONSULTS
Nephrology Consult  [facility]  Mick Ewing. MD    CC:    Admitted with HEATHER, pneumonia and shortness of breath. HPI:   Patient is a 50-year-old male with a history of multisubstance abuse, hepatic cirrhosis, MI, pacemaker placement, mitral valve surgery that presents emergency department for evaluation of chest pain. Patient states that the pain started 2 days ago on the right side of the chest.  It is a sharp stabbing sensation worse with deep inspiration. Vital signs upon arrival included temperature 97.4, blood pressure 97/63, pulse 71, respirations 20 and 99% O2 saturation. Initial lab work revealed WBC 17.0, hemoglobin 7.5, and platelet count 083. Initial BMP revealed sodium 133, potassium 4.2, chloride 106, CO2 11, BUN 80 and creatinine 5.6. Anion gap 16.  proBNP 15,608. Chest x-ray revealed right lung pneumonia as well as mild bilateral pleural effusions. EKG showed sinus tachycardia with a rate of 116. It was felt the patient was not a good candidate for CT of the chest due to elevated serum creatinine. VQ scan ordered. He was started on IV antibiotics for pneumonia. Ultimately admitted with HEATHER, pneumonia and shortness of breath. Patient now examined sitting up in bed in no acute distress. He denies any chest pain currently but is visibly short of breath even with conversation. Currently no nausea or vomiting in fact he has a good appetite. He denies any problems with diarrhea. He states that over the past 48 hours his urine has become more concentrated. He denies any dysuria or hematuria. He also denies any known history of kidney disease and has not followed with nephrology in the past.  He denies any NSAID usage.     PMH:    Past Medical History:   Diagnosis Date    Bacterial endocarditis 2/2013    CAD (coronary artery disease)     [facility]atic cirrhosis [facility] to [facility] B ([facility] 1/5/2016    Heroin abuse ([facility]     clean since 2012    MI (myocardial infarction) ([facility]  Seizures ([facility]     Substance abuse ([facility]     Unspecified cerebral artery occlusion with cerebral infarction        Patient Active Problem List   Diagnosis    Altered mental status    Elevated troponin    Cocaine abuse ([facility]    Sepsis (HCC)    Subacute bacterial endocarditis    Sepsis ([facility]    Acute kidney injury superimposed on CKD (HCC)    Hepatic cirrhosis due to hepatitis B (HCC)    Ascites    Pneumonia    Polysubstance abuse ([facility]    History of endocarditis    History of seizure    Tobacco dependence    Myalgia    Metabolic acidosis    Thrombocytopenia (HCC)    Petechial rash    Anemia    Problem with vascular access    Hypocalcemia    Elevated d-dimer    Valvular heart disease    Hypotension    Left arm weakness       Meds:     lidocaine PF  5 mL Intradermal Once    sodium chloride flush  5-40 mL Intravenous 2 times per day    sodium chloride flush  5-40 mL Intravenous 2 times per day    doxycycline (VIBRAMYCIN) IV  100 mg Intravenous Q12H    cefTRIAXone (ROCEPHIN) IV  1,000 mg Intravenous Q24H        sodium chloride      sodium chloride 125 mL/hr (04/21/21 0528)       Meds prn:     sodium chloride flush, sodium chloride, sodium chloride flush    Meds prior to admission:     No current facility-administered medications on file prior to encounter. No current outpatient medications on file prior to encounter. Allergies:    Coumadin [warfarin] and Heparin    Social History:     reports that he has been smoking cigarettes. He has been smoking about 1.00 pack per day. He has quit using smokeless tobacco. He reports current drug use. Drug: Marijuana. He reports that he does not drink alcohol. Family History:     History reviewed. No pertinent family history.     ROS:     General: no fever, chills   Heent: no nasal congestion, sore throat   Resp: no cough, sob , hemoptysis  Cardiac: no cp , le edema, palpitations  Gi: no nausea, vomiting, melena, abd pain, hematemesis  Gu: no hematuria, dysuria   Neruo: no numbness, weakness, headache, blurry vision   Endocrine:  no h/o dm  Derm: no rash , petechia noted  Heme: no epistaxis, bruising  All other sx negative     Physical Exam:      Patient Vitals for the past 24 hrs:   BP Temp Temp src Pulse Resp SpO2 Height Weight   04/21/21 0245 (!) 120/58 98.8 °F (37.1 °C) Temporal 78 16 -- -- --   04/20/21 2001 (!) 102/57 97.6 °F (36.4 °C) Oral 80 18 99 % 6' 3\" (1.905 m) 202 lb 12.8 oz (92 kg)   04/20/21 1834 99/69 -- -- 78 18 96 % -- --   04/20/21 1815 97/66 -- -- 74 20 97 % -- --   04/20/21 1528 97/63 97.4 °F (36.3 °C) Infrared 71 20 99 % 6' 3\" (1.905 m) 245 lb (111.1 kg)         Intake/Output Summary (Last 24 hours) at 4/21/2021 1152  Last data filed at 4/21/2021 1025  Gross per 24 hour   Intake 480 ml   Output 300 ml   Net 180 ml       Constitutional: Patient in no acute distress   Head: normocephalic, atraumatic   Neck: supple, no jvd  Cardiovascular: regular rate and rhythm, no murmurs, gallops, or rubs   Respiratory: Clear, but diminished in both bases  Gastrointestinal: soft, nontender, nondistended, no hepatosplenomegaly  Ext: No lower extremity edema   Skin: dry, no rash   Back: nontender    Data:    Recent Labs     04/20/21  1606 04/21/21  0349   WBC 17.0* 14.3*   HGB 7.5* 7.0*   HCT 23.9* 21.6*   MCV 88.5 86.1   * 140       Recent Labs     04/20/21  1606 04/21/21  0349    131*   K 4.2 3.8    100   CO2 11* 15*   CREATININE 5.6* 6.5*   BUN 80* 95*   LABGLOM 11 9   GLUCOSE 98 113*   CALCIUM 7.2* 8.5*   PHOS  --  6.5*       No results found for: VITD25    No results found for: PTH    Recent Labs     04/20/21  1606   ALT 10   AST 19   ALKPHOS 118   BILITOT 0.5       Recent Labs     04/20/21  1606 04/21/21  0349   LABALBU 2.0* 2.8*       Ferritin   Date Value Ref Range Status   01/31/2014 448 ng/mL Final     Comment:     Reference Ranges-  Adult males 20 - 60 years-    30 - 400 ng/mL  Adult females 16 - 61 years-  13 - 150 ng/mL  Adults greater than 60 years- no established reference range  Pediatrics-                   no established reference range     Iron   Date Value Ref Range Status   04/21/2021 37 (L) 59 - 158 mcg/dL Final     TIBC   Date Value Ref Range Status   04/21/2021 163 (L) 250 - 450 mcg/dL Final       Vitamin B-12   Date Value Ref Range Status   04/21/2021 629 211 - 946 pg/mL Final       Folate   Date Value Ref Range Status   04/21/2021 5.1 4.8 - 24.2 ng/mL Final         Lab Results   Component Value Date    COLORU Yellow 01/19/2016    NITRU Negative 01/19/2016    GLUCOSEU Negative 01/19/2016    KETUA Negative 01/19/2016    UROBILINOGEN 0.2 01/19/2016    BILIRUBINUR Negative 01/19/2016       Lab Results   Component Value Date/Time    IDALIA 512 04/19/2014 10:35 PM       No components found for: URIC    No results found for: LIPIDPAN      Assessment and Plan:    1. Acute on chronic kidney disease  Baseline serum creatinine ranging 1.3 -> 1.5 since November 2014  Most recent serum creatinine 1.0 on 1/17/17  Now admitted with serum creatinine 5.6 in the setting of SOB w/ CP  CT imaging from 4/21/21 shows no evidence of hydronephrosis as well as no evidence of renal or ureteral calculus  Need urinalysis and urine electrolytes  Follow closely  Strict intake and output  Avoid NSAIDs  Daily labs including total CK  Continue ivf     2. Metabolic acidosis  Secondary to severe dehydration and renal failure  Already improving with IV fluids  We will add oral bicarbonate for now and follow closely     3. SOB  With elevated D-dimer  Await VQ scan results  Also started on IV antibiotics for pneumonia    4. Thrombocytopenia  In a patient with liver disease  Petechial rash noted  Further work-up per the primary team    5. Anemia  Follow closely  Transfuse as needed for hemoglobin less than 7.0    6.   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

## 2021-04-21 NOTE — H&P
Hospital Medicine History & Physical      PCP: Sabi Everett DO    Date of Admission: 4/20/2021    Date of Service: Pt seen/examined on 4/20/2021 and Admitted to Inpatient with expected LOS greater than two midnights due to medical therapy. Chief Complaint: Chest pain      History Of Present Illness:      54 y.o. male who presented to [address] [place] [address] with past medical history of substance abuse-cocaine/heroine, hepatitis B viral infection with liver cirrhosis, ascites status post paracentesis, seizures, coronary artery disease, CVA, recurrent MRSA infective endocarditis status post mitral valve replacement with bioprosthetic valve x3 and HIT. Last redo was in 2014 and mitral valve prosthetic valve leak closure, atrial fibrillation/bradycardia/pauses, has a pacemaker placed in 2014, and tobacco dependence. Patient is irritable, he has not been cooperating with nursing staff, states that he has been sick for a week, \"I feel like shit and everything hurts\". He reports shortness of breath, this is on and off, worse with exertion, associated with nausea and one episode of vomiting yesterday. He denies any cough or chest pain even though he was sent here for chest pain. He does not want to answer questions and tends to snap when asked any questions. He has his head covered in his bedsheet and would not engage. Rash noted on his extremities, states that they have been there for 2 weeks. He has very difficult vascular access. Very dark urine in urinal by bedside. Vital signs notable for respiratory rate of 20, blood pressure 97/63, labs showed D-dimer greater than 5250, proBNP 15,608, creatinine 5.6, last documented baseline was in 2017, this was 1, CO2 11, tested negative for Covid, calcium 7.2, white count 17, hemoglobin 7.5, platelet count 825, troponin 0 0.03. EKG showed acid to junctional rhythm rate of 116 with incomplete left bundle branch block, ST depression in 2, 3, aVF, V2 through V6.   Last documented echo was in 2014, EF was 55% with abnormal paradoxical motion, mitral valve vegetation, mild to moderate TR, mild AR and pulmonary hypertension. Chest x-ray tonight shows right lung pneumonia with small bilateral effusions. He is being admitted for further management. Past Medical History:          Diagnosis Date    Bacterial endocarditis 2/2013    CAD (coronary artery disease)     Hepatic cirrhosis due to [facility] B ([facility] 1/5/2016    [facility]in [facility] ([facility]     clean since 2012    MI [facility] infarction) [facility]     Seizures ([facility]     Substance abuse [facility]     Unspecified cerebral artery occlusion with cerebral infarction        Past Surgical History:          Procedure Laterality Date    CARDIAC SURGERY      ECHO COMPL W DOP COLOR FLOW  3/15/2013         ECHO COMPL W DOP COLOR FLOW  7/3/2013         ECHO COMPL W DOP COLOR FLOW  1/20/2014         ECHOCARDIOGRAM TRANSESOPHAGEAL  3/18/2013         ECHOCARDIOGRAM TRANSESOPHAGEAL  1/21/2014            Medications Prior to Admission:      Prior to Admission medications    Not on File       Allergies:  Coumadin [warfarin] and Heparin    Social History:      The patient currently lives home. TOBACCO:   reports that he has been smoking cigarettes. He has been smoking about 1.00 pack per day. He has quit using smokeless tobacco.  ETOH:   reports no history of alcohol use. Drugs: History of cocaine and heroin use. Denies any current usage. Family History:     Reviewed in detail Positive as follows:    History reviewed. No pertinent family history. REVIEW OF SYSTEMS:   Pertinent positives as noted in the HPI. All other systems reviewed and negative.     PHYSICAL EXAM:    BP (!) 102/57   Pulse 80   Temp 97.6 °F (36.4 °C) (Oral)   Resp 18   Ht 6' 3\" (1.905 m)   Wt 202 lb 12.8 oz (92 kg)   SpO2 99%   BMI 25.35 kg/m²     General appearance: Middle-age male, irritable, no apparent distress, appears stated age and uncooperative, afebrile. HEENT:  Normal cephalic, atraumatic without obvious deformity. Pupils equal, round, and reactive to light. Conjunctivae/corneas clear. Neck: Supple, with limited range of motion. No jugular venous distention. Trachea midline. Respiratory:  Normal respiratory effort. Clear to auscultation, bilaterally without Rales/Wheezes/Rhonchi. Cardiovascular:  Regular rate and rhythm with normal S1/S2 with murmur, no rubs or gallops. Abdomen: Soft, non-tender, non-distended with normal bowel sounds. Musculoskeletal:  No clubbing, cyanosis or edema bilaterally. Limited range of motion without deformity. Skin: Skin color, texture, turgor normal.  Diffuse petechia in his legs only  Neurologic:  grossly non-focal.  Psychiatric:  Alert and oriented, thought content appropriate, normal insight  Capillary Refill: Brisk,< 3 seconds   Peripheral Pulses: +2 palpable, equal bilaterally       Labs:     Recent Labs     04/20/21  1606   WBC 17.0*   HGB 7.5*   HCT 23.9*   *     Recent Labs     04/20/21  1606      K 4.2      CO2 11*   BUN 80*   CREATININE 5.6*   CALCIUM 7.2*     Recent Labs     04/20/21  1606   AST 19   ALT 10   BILITOT 0.5   ALKPHOS 118     No results for input(s): INR in the last 72 hours. Recent Labs     04/20/21  1606   TROPONINI 0.03       Urinalysis:      Lab Results   Component Value Date    NITRU Negative 01/19/2016    WBCUA NONE 01/19/2016    BACTERIA RARE 01/19/2016    RBCUA 0-1 01/19/2016    RBCUA 5-10 01/19/2014    BLOODU SMALL 01/19/2016    SPECGRAV 1.010 01/19/2016    GLUCOSEU Negative 01/19/2016       Radiology:   Reviewed and documented    XR CHEST PORTABLE   Final Result   Right lung pneumonia. Mild bilateral pleural effusion.          NM LUNG VENT/PERFUSION (VQ)    (Results Pending)       ASSESSMENT:    Active Hospital Problems    Diagnosis Date Noted    Polysubstance abuse [facility]) [F19.10] 04/21/2021    History of endocarditis [Z86.79] 04/21/2021    History of seizure [Z87.898] 04/21/2021    Tobacco dependence [F17.200] 04/21/2021    Myalgia [M79.10] 81/53/1299    Metabolic acidosis [W73.7] 04/21/2021    Thrombocytopenia (HCC) [D69.6] 04/21/2021    Petechial rash [R23.3] 04/21/2021    Anemia [D64.9] 04/21/2021    Problem with vascular access [Z78.9] 04/21/2021    Hypocalcemia [E83.51] 04/21/2021    Elevated d-dimer [R79.89] 04/21/2021    Valvular heart disease [I38] 04/21/2021    Hypotension [I95.9] 04/21/2021    Pneumonia [J18.9] 04/20/2021    Hepatic cirrhosis due to hepatitis B ([facility] [K74.60, B19.10] 01/05/2016    Acute kidney injury superimposed on CKD ([facility] [N17.9, N18.9] 04/19/2014    Sepsis ([facility] [A41.9] 04/19/2014         PLAN:  1. Pneumonia, IV Rocephin and doxy, urine Legionella antigen and streptococcal antigen, procalcitonin. 2.  Elevated D-dimer, suspicious for PE given initial presentation of chest pain, VQ scan has been ordered. Anticoagulation currently not started due to thrombocytopenia. 3.  Renal failure of unknown chronicity, likely acute on chronic kidney injury, patient is dehydrated with very concentrated urine, give fluids, monitor urinary output and kidney function. Nephrology consult if kidney function does not improve as expected, renal imaging to be obtained at that point. Obtain CPK level. 4.  Sepsis secondary to pneumonia. White count 17, heart rate is 116 on EKG. give fluids, blood cultures. 5.  Metabolic acidosis secondary to severe dehydration and renal failure. Check lactic acid level, give fluids. 6.  Thrombocytopenia in a patient with liver disease. Monitor platelet count. Patient has petechial rash. 7.  Petechial rash of extremities in a patient with low platelet count. ? History of HIT. monitor. Avoid anticoagulation. Check INR. 8.  Chest pain with abnormal EKG, patient with incomplete left bundle branch block. Cycle enzymes. Denies chest pain at the moment.   9.  Anemia, monitor hemoglobin, transfuse as needed if less than 7. Anemia work-up. 10.  Hypotension secondary to hypovolemia, renal failure and infection. IV fluids. Monitor vitals. 11.  Difficult vascular access, IV therapy in the morning. May need midline or PICC line. 12.  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 He has had mitral valve replaced with bioprosthetic valve multiple timesx2 in 2013, 3rd redo was in April 29, 2014. Has residual valvular heart disease involving the tricuspid valve. 13.  Myalgia, rule out rhabdomyolysis. 14.  Hepatitis B with liver cirrhosis, check ammonia level. HIV test  15. Hypocalcemia, check ionized calcium and correct as needed. 16.  Agitation and irritable behavior, check urine drug screen. 17.  History of seizure not on any medications  18. History of a.fib/bradycardia/pauses status post pacemaker in 2014. 19. Polysubstance abuse  20. Tobacco dependence      DVT Prophylaxis: SCDs, avoid anticoagulation due to low platelet count and petechia  rash  Diet: No diet orders on file renal.  Code Status: Prior full    PT/OT Eval Status: As needed    Dispo -inpatient/telemetry       Laine Richard MD    Thank you Al Marcano DO for the opportunity to be involved in this patient's care.

## 2021-04-21 NOTE — SIGNIFICANT EVENT
Reports left upper extremity weakness x 2 days. Reexamined at bedside. Has a drift of left arm. He was not cooperative with exam last night. Power left UE 2-3/5. Obtain STAT head CT. unable to get MRI. Has Pace maker. Neurology consult. Antiplatelet per neurology, has low platelets and petechiae.

## 2021-04-22 NOTE — PROGRESS NOTES
Patient is very argumentive over everything. states he doesn't know the doctors names of who put in his pacer or valve replacements. all he was able to tell me was that it was done at [facility].patient signed release of health info and this form was faxed to [address] [place] [address] for release of all health info. this was done to obtain info on pacer and valve replacements in preparation for mri of brain.

## 2021-04-22 NOTE — CARE COORDINATION
4/22/2021 - Care Coordination - admitted for pneumonia, LUE weakness. Hx pacemaker, mitral valve replacement, CVA,  hepatitis B, and substance abuse. Cardiology, neurology, nephrology, neurosurgery consults. Spoke with pt in room to discuss discharge planning. Has no PCP. Has no set pharmacy that he uses. Lives with his sister in her 2 story house with a flight of stairs to 2nd floor where his bed/bath are located. His mother lives across the street. Denies hx HHC. Reports he has been in several DEB over the years but unsure of which ones. DME - [facility]. Current plan is to discharge home when medically stable. His daughter will provide transportation home. SW/CM will follow.

## 2021-04-22 NOTE — PROGRESS NOTES
Hospitalist Progress Note      PCP: Shakir Elder DO    Date of Admission: 4/20/2021  Days in the hospital: 2    Hospital Course:   50-year-old male patient with multiple medical problems including history of substance abuse, hepatitis B, liver cirrhosis, MRSA endocarditis, history of CVA, CAD who comes in with vague symptoms including some shortness of breath, nausea and vomiting and generalized feeling of not feeling well. He was noted to have pneumonia and sepsis at the time of admission started on empiric antibiotics. Chest x-ray did show right middle and lower lung consolidation. Patient also was noted to have acute kidney injury with creatinine of 6.5. Overnight patient had some left-sided weakness noted and CT of the head was done which showed left parietal lobe tiny subarachnoid hemorrhage. Consultation was placed to neurology. Patient also was noted to have pancytopenia. Consultation was placed to neurosurgery and plan is for patient to have MRI.       Subjective  Patient seen and examined at bedside. Still having some left hand weakness, MRI pending, chart reviewed, overnight events reviewed. Denies any headache, change in vision. Neurosurgery following. Exam:    /68   Pulse 70   Temp 99.8 °F (37.7 °C) (Temporal)   Resp 16   Ht 6' 3\" (1.905 m)   Wt 202 lb 12.8 oz (92 kg)   SpO2 96%   BMI 25.35 kg/m²     HEENT: + Pallor, no icterus. Respiratory:  CTA, good air entry. Cardiovascular: RRR, no murmur. Abdomen: Soft, non-tender, BS noted. Musculoskeletal: No joint pains or joint swelling noted.    Neurologic: awake, alert and following commands       Assessment/Plan:  Active Hospital Problems    Diagnosis Date Noted    Polysubstance abuse [facility]) [F19.10] 04/21/2021    History of endocarditis [Z86.79] 04/21/2021    History of seizure [Z87.898] 04/21/2021    Tobacco dependence [F17.200] 04/21/2021    Myalgia [M79.10] 31/01/3939    Metabolic acidosis [O68.4] 04/21/2021 Intradermal Once    sodium chloride flush  5-40 mL Intravenous 2 times per day    sodium bicarbonate  1,300 mg Oral BID    sodium chloride flush  5-40 mL Intravenous 2 times per day    doxycycline (VIBRAMYCIN) IV  100 mg Intravenous Q12H    cefTRIAXone (ROCEPHIN) IV  1,000 mg Intravenous Q24H     PRN Meds: LORazepam, sodium chloride flush, sodium chloride, perflutren lipid microspheres, acetaminophen, sodium chloride flush      Intake/Output Summary (Last 24 hours) at 4/22/2021 0836  Last data filed at 4/22/2021 0551  Gross per 24 hour   Intake 480 ml   Output 850 ml   Net -370 ml     · Body mass index is 25.35 kg/m². · Diet  · DIET GENERAL;    · Code Status  · Prior       Electronically signed by Viktoria Troy MD on 4/22/2021 at 8:36 AM  [employer]   Please contact me through perfect serve    NOTE: This report was transcribed using voice recognition software. Every effort was made to ensure accuracy; however, inadvertent computerized transcription errors may be present.

## 2021-04-22 NOTE — PROGRESS NOTES
Roxy Sanchez is a 54 y.o. left handed male     Neurology following for LUE weakness    PMH of stroke (right parietal, left corpus callosum, left cerebellar in 2013 due to infective endocarditis. CAD/MI, infective endocarditis s/p MVR, pacemaker implantation, liver cirrhosis (due to HBV infection), IV drug abuse, HIT    Presented to ED with chest pain, shortness of breath and LUE weakness. No paresthesia in the LUE, no other neuro symptoms as well. CTH with questionable left parietal SAH but negative for acute infarct or mass. Patient is lying in bed watching TV. MRI brain and MRA neck is pending along with ECHO. He continues to have to LUE weakness. He notes claustrophobia. He has a history of strokes. 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       No chest pain or palpitations  No coughing or wheezing    No vertigo, lightheadedness or loss of consciousness  No falls, tripping or stumbling  No incontinence of bowels or bladder  No itching or bruising appreciated    ROS otherwise negative      Objective:     BP (!) 113/54   Pulse 78   Temp 98.6 °F (37 °C) (Temporal)   Resp 16   Ht 6' 3\" (1.905 m)   Wt 202 lb 12.8 oz (92 kg)   SpO2 99%   BMI 25.35 kg/m²     General appearance: alert, appears stated age, cooperative and no distress  Head: normocephalic, without obvious abnormality, atraumatic  Eyes: conjunctivae/corneas clear  Neck: supple, symmetrical, trachea midline   Lungs: respirations non labored but expiratory wheezing noted   Heart: regular rate and rhythm on monitor   Extremities:  normal, atraumatic, no cyanosis or edema  Skin: color, texture, turgor normal---no rashes or lesions      Mental Status: Alert, oriented, thought content appropriate     Appropriate attention/concentration  Intact fundus of knowledge  Repetition intact  Intact memories      Speech: no dysarthria   Language: no aphasias     Cranial Nerves:  I: smell NA   II: visual acuity  NA   II: visual fields Full to confrontation   II: pupils CHRIS   III,VII: ptosis None   III,IV,VI: extraocular muscles  Full ROM   V: mastication Normal   V: facial light touch sensation  Normal   V,VII: corneal reflex     VII: facial muscle function - upper  Normal   VII: facial muscle function - lower Normal   VIII: hearing Normal   IX: soft palate elevation  Normal   IX,X: gag reflex    XI: trapezius strength  5/5   XI: sternocleidomastoid strength 5/5   XI: neck extension strength  5/5   XII: tongue strength  Normal     Motor:  5/5 throughout except LUE 4/5 strength  Normal bulk and tone  No drift   No abnormal movements    Sensory:  LT and PP normal  Vibration normal    Coordination:   FN, FFM and PORFIRIO normal on the right and ataxic on left     DTR:   2+ throughout     No Babinskis    Laboratory/Radiology:     CBC with Differential:    Lab Results   Component Value Date    WBC 13.0 04/22/2021    RBC 2.43 04/22/2021    HGB 7.0 04/22/2021    HCT 21.4 04/22/2021     04/22/2021    MCV 88.1 04/22/2021    MCH 28.8 04/22/2021    MCHC 32.7 04/22/2021    RDW 15.3 04/22/2021    SEGSPCT 68 02/04/2014    BANDSPCT 1 05/06/2016    LYMPHOPCT 11.0 04/22/2021    MONOPCT 8.6 04/22/2021    BASOPCT 0.5 04/22/2021    MONOSABS 1.11 04/22/2021    LYMPHSABS 1.42 04/22/2021    EOSABS 0.30 04/22/2021    BASOSABS 0.06 04/22/2021     CMP:    Lab Results   Component Value Date     04/22/2021     04/22/2021    K 3.9 04/22/2021    K 3.9 04/22/2021     04/22/2021     04/22/2021    CO2 14 04/22/2021    CO2 13 04/22/2021    BUN 87 04/22/2021    BUN 85 04/22/2021    CREATININE 5.9 04/22/2021    CREATININE 5.8 04/22/2021    GFRAA 13 04/22/2021    GFRAA 13 04/22/2021    LABGLOM 10 04/22/2021    LABGLOM 11 04/22/2021    GLUCOSE 109 04/22/2021    GLUCOSE 108 04/22/2021    PROT 7.1 04/20/2021    LABALBU 2.7 04/22/2021    CALCIUM 8.8 04/22/2021    CALCIUM 8.8 04/22/2021    BILITOT 0.5 04/20/2021    ALKPHOS 118 04/20/2021    AST 19 04/20/2021    ALT 10 04/20/2021     Hepatic Function Panel:    Lab Results   Component Value Date    ALKPHOS 118 04/20/2021    ALT 10 04/20/2021    AST 19 04/20/2021    PROT 7.1 04/20/2021    BILITOT 0.5 04/20/2021    BILIDIR 0.5 01/20/2016    IBILI 0.6 01/20/2016    LABALBU 2.7 04/22/2021     CTH: Focal hyperattenuation in left parietal lobe may represent tiny subarachnoid  Hemorrhage. No evidence of acute infarction. Encephalomalacia seen in right parietal lobe.     All labs and imaging studies reviewed independently today     Assessment:     Concern for recurrent thromboembolic event with questionable SAH on [facility]  --- history of stroke due to infective endocarditis  --- LUE weakness only deficit  --- LDL and A1c needed  --- D dimer > 5000    Plan:     MRI brain and MRA head/neck ordered  May need to do [facility], CTA's head/neck if unable to do MRI  ECHO pending  LDL and A1c pending   NSGY following  Neurology to follow       JESSICA Magana, CNP  11:16 AM  4/22/2021

## 2021-04-22 NOTE — CONSULTS
Department of Internal Medicine  Infectious Diseases   Consult Note      Reason for Consult:  Strep bacteremia     Requesting Physician:  Dr Jamia Waterman:       This is a 54 yrs old male with hx of IVIDU ( 7 yrs ago ) , hx of recurrent MRSA endocarditis s/p MVR X 3  ( bioprosthetic ) ( 3/2013 , 7/2013, 4/2014 ) , permanent pacer insertion ( 5/2014) - hep B infection, liver cirrhosis, CAD, Stroke presented to the ER with syncope . Pt stated that his daughter called the EMS and was brought to the ER . He denied fever, chills, headache, chest pain . He reported shortness of breath, denied cough or sputum expectoration . He reported whole body ache . WBC was 17 K, pro BNP 15,608   Chest x ray - bilateral infiltrates , CT abdomen and pelvis - gall stone, pericholecystic fat stranding . He was started on IV rocephin and doxycycline .    Blood cx growing Streptococcus       Past Medical History:      Past Medical History:   Diagnosis Date    Bacterial endocarditis 2/2013    CAD (coronary artery disease)     Hepatic cirrhosis due to hepatitis B ([address] 1/5/2016    Heroin abuse ([address]     clean since 2012    MI (myocardial infarction) ([address]     Seizures ([address]     Substance abuse ([address]     Unspecified cerebral artery occlusion with cerebral infarction          Past Surgical History:      Past Surgical History:   Procedure Laterality Date    CARDIAC SURGERY      ECHO COMPL W DOP COLOR FLOW  3/15/2013         ECHO COMPL W DOP COLOR FLOW  7/3/2013         ECHO COMPL W DOP COLOR FLOW  1/20/2014         ECHOCARDIOGRAM TRANSESOPHAGEAL  3/18/2013         ECHOCARDIOGRAM TRANSESOPHAGEAL  1/21/2014            Current Medications:      Current Facility-Administered Medications   Medication Dose Route Frequency Provider Last Rate Last Admin    LORazepam (ATIVAN) tablet 0.5 mg  0.5 mg Oral Q4H PRN Selina Arce MD   0.5 mg at 04/22/21 0205    [START ON 4/23/2021] epoetin kranthi-epbx (RETACRIT) injection 3,000 Units  3,000 Units Subcutaneous Once per day on Mon Wed Fri Domingo Booker MD        lidocaine PF 1 % injection 5 mL  5 mL Intradermal Once Madhavi Lambert MD        sodium chloride flush 0.9 % injection 5-40 mL  5-40 mL Intravenous 2 times per day Madhavi Lambert MD   10 mL at 04/21/21 2109    sodium chloride flush 0.9 % injection 5-40 mL  5-40 mL Intravenous PRN Madhavi Lambert MD        0.9 % sodium chloride infusion  25 mL Intravenous PRN Madhavi Lambert MD        perflutren lipid microspheres (DEFINITY) injection 1.65 mg  1.5 mL Intravenous ONCE PRN Gigi Slaughter MD        sodium bicarbonate tablet 1,300 mg  1,300 mg Oral BID Kathrin Olszewski, APRN - CNP   1,300 mg at 04/22/21 0844    acetaminophen (TYLENOL) tablet 650 mg  650 mg Oral Q6H PRN Denis Marie MD   650 mg at 04/21/21 1936    sodium chloride flush 0.9 % injection 5-40 mL  5-40 mL Intravenous 2 times per day Ray Cook., DO   10 mL at 04/20/21 2119    sodium chloride flush 0.9 % injection 5-40 mL  5-40 mL Intravenous PRN Ray Cook., DO        doxycycline (VIBRAMYCIN) 100 mg in dextrose 5 % 100 mL IVPB  100 mg Intravenous Q12H Ray Cook., DO   Stopped at 04/22/21 0600    cefTRIAXone (ROCEPHIN) 1,000 mg in sterile water 10 mL IV syringe  1,000 mg Intravenous Q24H Madhavi Lambert MD   1,000 mg at 04/21/21 1711    0.9 % sodium chloride infusion   Intravenous Continuous Madhavi Lambert  mL/hr at 04/21/21 0528 125 mL/hr at 04/21/21 8247       Allergies:  Coumadin [warfarin] and Heparin    Social History:    Social History     Socioeconomic History    Marital status: Single     Spouse name: Not on file    Number of children: Not on file    Years of education: Not on file    Highest education level: Not on file   Occupational History    Not on file   Social Needs    Financial resource strain: Not on file    Food insecurity     Worry: Not on file     Inability: Not on file   Shaikh no icterus,   Ears:    No obvious deformity or drainage.    Nose:   No nasal drainage   Throat:   Mucosa moist, no oral thrush   Neck:   Supple, no lymphadenopathy   Lungs:     Crackles at the bases     Heart:    Regular rate and rhythm, no murmur   Abdomen:     Soft, non-tender, bowel sounds present    Extremities:   No edema, no cyanosis   Pulses:   Dorsalis pedis palpable    Skin:   no rashes or lesions         CBC with Differential:      Lab Results   Component Value Date    WBC 13.0 04/22/2021    RBC 2.43 04/22/2021    HGB 7.0 04/22/2021    HCT 21.4 04/22/2021     04/22/2021    MCV 88.1 04/22/2021    MCH 28.8 04/22/2021    MCHC 32.7 04/22/2021    RDW 15.3 04/22/2021    SEGSPCT 68 02/04/2014    BANDSPCT 1 05/06/2016    LYMPHOPCT 11.0 04/22/2021    MONOPCT 8.6 04/22/2021    BASOPCT 0.5 04/22/2021    MONOSABS 1.11 04/22/2021    LYMPHSABS 1.42 04/22/2021    EOSABS 0.30 04/22/2021    BASOSABS 0.06 04/22/2021       CMP     Lab Results   Component Value Date     04/22/2021     04/22/2021    K 3.9 04/22/2021    K 3.9 04/22/2021     04/22/2021     04/22/2021    CO2 14 04/22/2021    CO2 13 04/22/2021    BUN 87 04/22/2021    BUN 85 04/22/2021    CREATININE 5.9 04/22/2021    CREATININE 5.8 04/22/2021    GFRAA 13 04/22/2021    GFRAA 13 04/22/2021    LABGLOM 10 04/22/2021    LABGLOM 11 04/22/2021    GLUCOSE 109 04/22/2021    GLUCOSE 108 04/22/2021    PROT 7.1 04/20/2021    LABALBU 2.7 04/22/2021    CALCIUM 8.8 04/22/2021    CALCIUM 8.8 04/22/2021    BILITOT 0.5 04/20/2021    ALKPHOS 118 04/20/2021    AST 19 04/20/2021    ALT 10 04/20/2021         Hepatic Function Panel:    Lab Results   Component Value Date    ALKPHOS 118 04/20/2021    ALT 10 04/20/2021    AST 19 04/20/2021    PROT 7.1 04/20/2021    BILITOT 0.5 04/20/2021    BILIDIR 0.5 01/20/2016    IBILI 0.6 01/20/2016    LABALBU 2.7 04/22/2021       PT/INR:    Lab Results   Component Value Date    PROTIME 16.2 01/17/2017    INR 1.5 01/17/2017

## 2021-04-22 NOTE — PROGRESS NOTES
[facility]  Nephrology Attending Progress Note  Boni Santoyo. MD Gildardo        SUBJECTIVE:     4/21: Patient is a 79-year-old male with a history of multisubstance abuse, hepatic cirrhosis, MI, pacemaker placement, mitral valve surgery that presents emergency department for evaluation of chest pain.  Patient states that the pain started 2 days ago on the right side of the chest.  It is a sharp stabbing sensation worse with deep inspiration. Vital signs upon arrival included temperature 97.4, blood pressure 97/63, pulse 71, respirations 20 and 99% O2 saturation.     Initial lab work revealed WBC 17.0, hemoglobin 7.5, and platelet count 087. Initial BMP revealed sodium 133, potassium 4.2, chloride 106, CO2 11, BUN 80 and creatinine 5.6. Anion gap 16.  proBNP 15,608. Chest x-ray revealed right lung pneumonia as well as mild bilateral pleural effusions. EKG showed sinus tachycardia with a rate of 116.     It was felt the patient was not a good candidate for CT of the chest due to elevated serum creatinine. VQ scan ordered. He was started on IV antibiotics for pneumonia. Ultimately admitted with HEATHER, pneumonia and shortness of breath.     Patient now examined sitting up in bed in no acute distress. He denies any chest pain currently but is visibly short of breath even with conversation. Currently no nausea or vomiting in fact he has a good appetite. He denies any problems with diarrhea. He states that over the past 48 hours his urine has become more concentrated. He denies any dysuria or hematuria.   He also denies any known history of kidney disease and has not followed with nephrology in the past.  He denies any NSAID usage.     4/22: pt seen in room, no complaints today, no cough or sob      PROBLEM LIST:    Patient Active Problem List   Diagnosis    Altered mental status    Elevated troponin    Cocaine abuse ([address]    Sepsis ([address]    Subacute bacterial endocarditis    Sepsis ([address]    [facility]    Hepatic cirrhosis due to hepatitis B ([facility]    Ascites    Pneumonia due to organism    [facility]    History of endocarditis    History of seizure    Tobacco dependence    Myalgia    Metabolic acidosis    Thrombocytopenia (HCC)    Petechial rash    Anemia    Problem with vascular access    Hypocalcemia    Elevated d-dimer    Valvular heart disease    Hypotension    Left arm weakness        PAST MEDICAL HISTORY:    Past Medical History:   Diagnosis Date    Bacterial endocarditis 2/2013    CAD (coronary artery disease)     Hepatic cirrhosis due to hepatitis B ([facility] 1/5/2016    Heroin abuse ([facility]     clean since 2012    MI (myocardial infarction) [facility]     Seizures ([facility]     Substance abuse [facility]     Unspecified cerebral artery occlusion with cerebral infarction        DIET:    DIET GENERAL;     PHYSICAL EXAM:     Patient Vitals for the past 24 hrs:   BP Temp Temp src Pulse Resp SpO2   04/22/21 1208 (!) 119/58 98.5 °F (36.9 °C) Temporal 80 16 95 %   04/22/21 0930 (!) 113/54 98.6 °F (37 °C) Temporal 78 16 99 %   04/22/21 0309 105/68 99.8 °F (37.7 °C) Temporal 70 16 --   04/22/21 0000 -- -- -- 74 16 --   04/21/21 1931 (!) 108/58 98.8 °F (37.1 °C) Temporal 77 16 --   04/21/21 1600 (!) 102/58 97.6 °F (36.4 °C) Temporal 75 16 96 %   @      Intake/Output Summary (Last 24 hours) at 4/22/2021 1253  Last data filed at 4/22/2021 0930  Gross per 24 hour   Intake 600 ml   Output 850 ml   Net -250 ml         Wt Readings from Last 3 Encounters:   04/20/21 202 lb 12.8 oz (92 kg)   01/17/17 240 lb (108.9 kg)   06/17/16 177 lb (80.3 kg)       Constitutional:  Pt is in no acute distress  Head: normocephalic, atraumatic  Neck: no JVD  Cardiovascular: regular rate and rhythm, no murmurs, gallops, or rubs  Respiratory:  No rales, rhochi, or wheezes  Gastrointestinal:  Soft, nontender, nondistended, bowel sounds x 4  Ext: no edema  Skin: dry, no rash  Neuro: aaox3    MEDS (scheduled):    lidocaine PF  5 mL Intradermal Once    sodium chloride flush  5-40 mL Intravenous 2 times per day    sodium bicarbonate  1,300 mg Oral BID    sodium chloride flush  5-40 mL Intravenous 2 times per day    doxycycline (VIBRAMYCIN) IV  100 mg Intravenous Q12H    cefTRIAXone (ROCEPHIN) IV  1,000 mg Intravenous Q24H       MEDS (infusions):   sodium chloride      sodium chloride 125 mL/hr (04/21/21 0528)       MEDS (prn):  LORazepam, sodium chloride flush, sodium chloride, perflutren lipid microspheres, acetaminophen, sodium chloride flush    DATA:    Recent Labs     04/20/21  1606 04/21/21  0349 04/22/21  0442   WBC 17.0* 14.3* 13.0*   HGB 7.5* 7.0* 7.0*   HCT 23.9* 21.6* 21.4*   MCV 88.5 86.1 88.1   * 140 141     Recent Labs     04/20/21  1606 04/21/21  0349 04/22/21  0442    131* 131*  133   K 4.2 3.8 3.9  3.9    100 102  103   CO2 11* 15* 13*  14*   BUN 80* 95* 85*  87*   CREATININE 5.6* 6.5* 5.8*  5.9*   LABGLOM 11 9 11  10   GLUCOSE 98 113* 108*  109*   CALCIUM 7.2* 8.5* 8.8  8.8   ALT 10  --   --    AST 19  --   --    BILITOT 0.5  --   --    ALKPHOS 118  --   --    MG  --   --  1.4*   PHOS  --  6.5* 6.3*       Lab Results   Component Value Date    LABALBU 2.7 (L) 04/22/2021    LABALBU 2.8 (L) 04/21/2021    LABALBU 2.0 (L) 04/20/2021     Lab Results   Component Value Date    TSH 0.212 (L) 01/19/2014       Iron Studies  Lab Results   Component Value Date    IRON 39 (L) 04/22/2021    TIBC 163 (L) 04/22/2021    FERRITIN 1,015 04/22/2021     Vitamin B-12   Date Value Ref Range Status   04/21/2021 629 211 - 946 pg/mL Final     Folate   Date Value Ref Range Status   04/21/2021 5.1 4.8 - 24.2 ng/mL Final       No results found for: VITD25  No results found for: PTH    No components found for: URIC    Lab Results   Component Value Date    COLORU Yellow 01/19/2016    NITRU Negative 01/19/2016    GLUCOSEU Negative 01/19/2016    KETUA Negative 01/19/2016 UROBILINOGEN 0.2 01/19/2016    BILIRUBINUR Negative 01/19/2016       No results found for: Shavonne Carrington      IMPRESSION/RECOMMENDATIONS:      1.  Acute on chronic kidney disease  Baseline serum creatinine ranging 1.3 -> 1.5 since November 2014  Most recent serum creatinine 1.0 on 1/17/17  Now admitted with serum creatinine 5.6 in the setting of SOB w/ CP  CT /21/21: no evidence of hydronephrosis as well as no evidence of renal or ureteral calculus  Strict intake and output  fena <1  Avoid NSAIDs  Daily labs including total CK  Continue ivf  Cr 0.0>0.4>9.6    2.    Metabolic acidosis  Secondary to severe dehydration and renal failure  Already improving with IV fluids  We will add oral bicarbonate for now and follow closely     3.   SOB  With elevated D-dimer  Also started on IV antibiotics for pneumonia     4.    Thrombocytopenia  In a patient with liver disease  Petechial rash noted  Further work-up per the primary team     5.   Anemia  Follow closely  Transfuse as needed for hemoglobin less than 7.0  Start alia     6. History of recurrent MRSA infective endocarditis secondary to IV drug use  History of MVR replacement x3  Echo with 50% EF in 2014 - consider repeat echo now            Bong Strickland.  Deisy Matt MD

## 2021-04-22 NOTE — PROCEDURES
Spoke with RN, per MR screening form pt has a Pacer and Heart Valve replacement. Will need information on both before clearing for MRI. Pt says he does not have implant cards and says he had them done at the [facility], informed RN we will need those records in order to check for MRI compatibility. Will inform Dr Vicente Luna of delays to MRI.

## 2021-04-23 NOTE — PROGRESS NOTES
Hospitalist Progress Note      PCP: Radha Phillips DO    Date of Admission: 4/20/2021  Days in the hospital: 3    Hospital Course:   41-year-old male patient with multiple medical problems including history of substance abuse, hepatitis B, liver cirrhosis, MRSA endocarditis, history of CVA, CAD who comes in with vague symptoms including some shortness of breath, nausea and vomiting and generalized feeling of not feeling well. He was noted to have pneumonia and sepsis at the time of admission started on empiric antibiotics. Chest x-ray did show right middle and lower lung consolidation. Patient also was noted to have acute kidney injury with creatinine of 6.5. Overnight patient had some left-sided weakness noted and CT of the head was done which showed left parietal lobe tiny subarachnoid hemorrhage. Consultation was placed to neurology. Patient also was noted to have pancytopenia. Consultation was placed to neurosurgery and plan is for patient to have MRI.       Subjective  Patient seen and examined at bedside. Still having some left hand weakness, MRI pending, chart reviewed, overnight events reviewed. Denies any headache, change in vision. Neurosurgery following. Exam:    BP (!) 110/58   Pulse 85   Temp 97.8 °F (36.6 °C) (Oral)   Resp 22   Ht 6' 3\" (1.905 m)   Wt 202 lb 12.8 oz (92 kg)   SpO2 95%   BMI 25.35 kg/m²     HEENT: + Pallor, no icterus. Respiratory:  CTA, good air entry. Cardiovascular: RRR, no murmur. Abdomen: Soft, non-tender, BS noted. Musculoskeletal: No joint pains or joint swelling noted.    Neurologic: awake, alert and following commands       Assessment/Plan:  Active Hospital Problems    Diagnosis Date Noted    Polysubstance abuse [facility]) [F19.10] 04/21/2021    History of endocarditis [Z86.79] 04/21/2021    History of seizure [Z87.898] 04/21/2021    Tobacco dependence [F17.200] 04/21/2021    Myalgia [M79.10] 07/76/9307    Metabolic acidosis [B80.2] 04/21/2021  Thrombocytopenia ([facility] [D69.6] 04/21/2021    Petechial rash [R23.3] 04/21/2021    Anemia [D64.9] 04/21/2021    Problem with vascular access [Z78.9] 04/21/2021    Hypocalcemia [E83.51] 04/21/2021    Elevated d-dimer [R79.89] 04/21/2021    Valvular heart disease [I38] 04/21/2021    Hypotension [I95.9] 04/21/2021    Left arm weakness [R29.898] 04/21/2021    Pneumonia due to organism [J18.9] 04/20/2021    Hepatic cirrhosis due to hepatitis B ([facility] [K74.60, B19.10] 01/05/2016    Acute kidney injury superimposed on CKD ([facility] [N17.9, N18.9] 04/19/2014    Sepsis ([facility] [A41.9] 04/19/2014     · Subarachnoid hemorrhage  · Gram-positive cocci in chains bacteremia and     Plan:  ? Patient noted to have gram-positive cocci in chains bacteremia, currently on Rocephin, will consult ID  ? Renal function slowly improving, follow-up with nephrology, patient having urine output, continue to monitor closely  ? Follow-up with neurology and neurosurgery, MRI pending, currently off antiplatelets  ? Acute on chronic anemia, monitor him, transfuse as indicated  ? ALANIS ordered and pending  ? Pt does not have insight as far as severity of his illness   ? Overall prognosis remains guarded         Labs:   Recent Labs     04/21/21 0349 04/22/21 0442 04/23/21 0423   WBC 14.3* 13.0* 14.2*   HGB 7.0* 7.0* 7.0*   HCT 21.6* 21.4* 22.5*    141 165     Recent Labs     04/21/21 0349 04/22/21 0442 04/23/21 0423   * 131*  133 130*  129*   K 3.8 3.9  3.9 3.9  3.9    102  103 102  100   CO2 15* 13*  14* 12*  12*   BUN 95* 85*  87* 81*  79*   CREATININE 6.5* 5.8*  5.9* 5.3*  5.3*   CALCIUM 8.5* 8.8  8.8 8.4*  8.4*   PHOS 6.5* 6.3* 6.4*     No results for input(s): AST, ALT, BILIDIR, BILITOT, ALKPHOS in the last 72 hours. No results for input(s): INR in the last 72 hours.   Recent Labs     04/22/21  2315 04/23/21  0423 04/23/21  1056   TROPONINI 0.06* 0.07* 0.05*       Medications:  Reviewed    Infusion

## 2021-04-23 NOTE — PROGRESS NOTES
Department of Internal Medicine  Infectious Diseases  Progress Note      C/C :  Strep bacteremia , CAP     Pt is awake and alert  Denies fever and chills  Reports SOB     Afebrile       Current Facility-Administered Medications   Medication Dose Route Frequency Provider Last Rate Last Admin    hydrOXYzine (VISTARIL) capsule 25 mg  25 mg Oral TID PRN Mary Jane Jean MD        guaiFENesin tablet 400 mg  400 mg Oral TID Mary Jane Jean MD        LORazepam (ATIVAN) tablet 0.5 mg  0.5 mg Oral Q4H PRN Carmen Harris MD   0.5 mg at 04/22/21 2147    epoetin kranthi-epbx (RETACRIT) injection 3,000 Units  3,000 Units Subcutaneous Once per day on Mon Wed Fri Yaakov Ramsay MD   3,000 Units at 04/23/21 0827    DAPTOmycin (CUBICIN) 500 mg in sodium chloride 0.9 % 50 mL IVPB  6 mg/kg (Ideal) Intravenous Q24H Sameer Rivera MD   Stopped at 04/22/21 1652    lidocaine PF 1 % injection 5 mL  5 mL Intradermal Once Carmen Harris MD        sodium chloride flush 0.9 % injection 5-40 mL  5-40 mL Intravenous 2 times per day Carmen Harris MD   10 mL at 04/23/21 0827    sodium chloride flush 0.9 % injection 5-40 mL  5-40 mL Intravenous PRN Carmen Harris MD        0.9 % sodium chloride infusion  25 mL Intravenous PRN Carmen Harris MD        perflutren lipid microspheres (DEFINITY) injection 1.65 mg  1.5 mL Intravenous ONCE PRN Marcelino Cavazos MD        sodium bicarbonate tablet 1,300 mg  1,300 mg Oral BID JESSICA Yuan - CNP   1,300 mg at 04/23/21 0827    acetaminophen (TYLENOL) tablet 650 mg  650 mg Oral Q6H PRN Merlinda Gallop, MD   650 mg at 04/21/21 1936    sodium chloride flush 0.9 % injection 5-40 mL  5-40 mL Intravenous 2 times per day Merdis Nettle., DO   Stopped at 04/22/21 2141    sodium chloride flush 0.9 % injection 5-40 mL  5-40 mL Intravenous PRN Merdis Nettle., DO        cefTRIAXone (ROCEPHIN) 1,000 mg in sterile water 10 mL IV syringe  1,000 mg Intravenous Q24H Carmen Harris MD 1,000 mg at 04/22/21 1819    0.9 % sodium chloride infusion   Intravenous Continuous Joseline Keith  mL/hr at 04/23/21 0427 New Bag at 04/23/21 0427             REVIEW OF SYSTEMS:    CONSTITUTIONAL:  Denies fever, chill or rigors. HEENT: denies blurring of vision or double vision, denies hearing problem  RESPIRATORY: SOB   CARDIOVASCULAR:  Denies palpitation  GASTROINTESTINAL:  Denies abdomen pain, diarrhea or constipation. GENITOURINARY:  Denies burning urination or frequency of urination  INTEGUMENT: denies wound , rash  HEMATOLOGIC/LYMPHATIC:  Denies lymph node swelling, gum bleeding or easy bruising. MUSCULOSKELETAL:  Denies leg pain , joint pain , joint swelling  NEUROLOGICAL:  Weakness, passed out       PHYSICAL EXAM:      Vitals:     Vitals:    04/23/21 0815   BP: (!) 110/58   Pulse: 85   Resp: 22   Temp: 97.8 °F (36.6 °C)   SpO2:        General Appearance:    Awake, alert , no acute distress. Head:    Normocephalic, atraumatic   Eyes:    No pallor, no icterus,   Ears:    No obvious deformity or drainage.    Nose:   No nasal drainage   Throat:   Mucosa moist, no oral thrush   Neck:   Supple, no lymphadenopathy   Lungs:     Crackles at the bases     Heart:    Regular rate and rhythm, no murmur   Abdomen:     Soft, non-tender, bowel sounds present    Extremities:   No edema, no cyanosis   Pulses:   Dorsalis pedis palpable    Skin:   no rashes or lesions         CBC with Differential:      Lab Results   Component Value Date    WBC 14.2 04/23/2021    RBC 2.46 04/23/2021    HGB 7.0 04/23/2021    HCT 22.5 04/23/2021     04/23/2021    MCV 91.5 04/23/2021    MCH 28.5 04/23/2021    MCHC 31.1 04/23/2021    RDW 15.8 04/23/2021    SEGSPCT 68 02/04/2014    BANDSPCT 1 05/06/2016    LYMPHOPCT 10.7 04/23/2021    MONOPCT 8.2 04/23/2021    BASOPCT 0.4 04/23/2021    MONOSABS 1.16 04/23/2021    LYMPHSABS 1.52 04/23/2021    EOSABS 0.28 04/23/2021    BASOSABS 0.06 04/23/2021       CMP     Lab Results Component Value Date     04/23/2021     04/23/2021    K 3.9 04/23/2021    K 3.9 04/23/2021     04/23/2021     04/23/2021    CO2 12 04/23/2021    CO2 12 04/23/2021    BUN 81 04/23/2021    BUN 79 04/23/2021    CREATININE 5.3 04/23/2021    CREATININE 5.3 04/23/2021    GFRAA 14 04/23/2021    GFRAA 14 04/23/2021    LABGLOM 12 04/23/2021    LABGLOM 12 04/23/2021    GLUCOSE 101 04/23/2021    GLUCOSE 99 04/23/2021    PROT 7.1 04/20/2021    LABALBU 2.4 04/23/2021    CALCIUM 8.4 04/23/2021    CALCIUM 8.4 04/23/2021    BILITOT 0.5 04/20/2021    ALKPHOS 118 04/20/2021    AST 19 04/20/2021    ALT 10 04/20/2021         Hepatic Function Panel:    Lab Results   Component Value Date    ALKPHOS 118 04/20/2021    ALT 10 04/20/2021    AST 19 04/20/2021    PROT 7.1 04/20/2021    BILITOT 0.5 04/20/2021    BILIDIR 0.5 01/20/2016    IBILI 0.6 01/20/2016    LABALBU 2.4 04/23/2021       PT/INR:    Lab Results   Component Value Date    PROTIME 16.2 01/17/2017    INR 1.5 01/17/2017       TSH:    Lab Results   Component Value Date    TSH 0.212 01/19/2014       U/A:    Lab Results   Component Value Date    COLORU Yellow 01/19/2016    PHUR 7.5 01/19/2016    WBCUA NONE 01/19/2016    RBCUA 0-1 01/19/2016    RBCUA 5-10 01/19/2014    BACTERIA RARE 01/19/2016    CLARITYU Clear 01/19/2016    SPECGRAV 1.010 01/19/2016    LEUKOCYTESUR Negative 01/19/2016    UROBILINOGEN 0.2 01/19/2016    BILIRUBINUR Negative 01/19/2016    BLOODU SMALL 01/19/2016    GLUCOSEU Negative 01/19/2016    AMORPHOUS FEW 04/19/2014       ABG:  No results found for: DAH5ZWJ, BEART, D1VNQTZS, PHART, THGBART, FEW0KCA, PO2ART, MQG7XZT    MICROBIOLOGY:    Blood culture -     Bottle Type Aerobic    Source of Blood Culture Antecubital-Rig    Order Number [id number]    Enterobacter cloacae complex by PCR Not Detected    Escherichia coli by PCR Not Detected    Klebsiella oxytoca by PCR Not Detected    Klebsiella pneumoniae group by PCR Not Detected    Proteus species by PCR Not Detected    Streptococcus agalactiae by PCR Not Detected    Staphylococcus aureus by PCR Not Detected    Serratia marcescens by PCR Not Detected    Streptococcus pneumoniae by PCR Not Detected    Streptococcus pyogenes  by PCR Not Detected    Acinetobacter baumannii by PCR Not Detected    Candida albicans by PCR Not Detected    Candida glabrata by PCR Not Detected    Candida krusei by PCR Not Detected    Candida parapsilosis by PCR Not Detected    Candida tropicalis by PCR Not Detected     Enterobacteriaceae by PCR Not Detected    Enterococcus by PCR Not Detected    Haemophilus Influenzae by PCR Not Detected    Listeria monocytogenes by PCR Not Detected    Neisseria meningitidis by PCR Not Detected    Pseudomonas aeruginosa by PCR Not Detected    Staphylococcus species by PCR Not Detected    Streptococcus species by PCR DETECTEDPanic     Narrative:             Radiology :    Chest X ray    CTA scan of abdomen and pelvis -    Limited evaluation without IV contrast.     Gallstone in the gallbladder with pericholecystic fat stranding and   gallbladder wall thickening may indicate acute cholecystitis.  Other etiology   may include edema or infiltrative hepatic disease.  Please correlate physical   exam.  Further evaluation may be obtained with HIDA scan there is concern   cholecystitis. No renal or ureteral calculus. IMPRESSION:       1. 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 Leukocytosis   3. CAP       RECOMMENDATIONS:      1. Rocephin 1 gram IV q 24 hrs  2. Daptomycin 6 mg/ kg IV q 24 hrs pending final blood cx   3. ALANIS   4.  Follow up blood cx

## 2021-04-23 NOTE — PROGRESS NOTES
[facility]  Nephrology Attending Progress Note  Shailesh Ewing MD        SUBJECTIVE:     4/21: Patient is a 79-year-old male with a history of multisubstance abuse, hepatic cirrhosis, MI, pacemaker placement, mitral valve surgery that presents emergency department for evaluation of chest pain.  Patient states that the pain started 2 days ago on the right side of the chest.  It is a sharp stabbing sensation worse with deep inspiration. Vital signs upon arrival included temperature 97.4, blood pressure 97/63, pulse 71, respirations 20 and 99% O2 saturation.     Initial lab work revealed WBC 17.0, hemoglobin 7.5, and platelet count 204. Initial BMP revealed sodium 133, potassium 4.2, chloride 106, CO2 11, BUN 80 and creatinine 5.6. Anion gap 16.  proBNP 15,608. Chest x-ray revealed right lung pneumonia as well as mild bilateral pleural effusions. EKG showed sinus tachycardia with a rate of 116.     It was felt the patient was not a good candidate for CT of the chest due to elevated serum creatinine. VQ scan ordered. He was started on IV antibiotics for pneumonia. Ultimately admitted with HEATHER, pneumonia and shortness of breath.     Patient now examined sitting up in bed in no acute distress. He denies any chest pain currently but is visibly short of breath even with conversation. Currently no nausea or vomiting in fact he has a good appetite. He denies any problems with diarrhea. He states that over the past 48 hours his urine has become more concentrated. He denies any dysuria or hematuria.   He also denies any known history of kidney disease and has not followed with nephrology in the past.  He denies any NSAID usage.     4/22: pt seen in room, no complaints today, no cough or sob  4/23: pt without complaints today    PROBLEM LIST:    Patient Active Problem List   Diagnosis    Altered mental status    Elevated troponin    [facility]    Sepsis ([facility]    Subacute bacterial endocarditis  Sepsis ([facility]    Acute kidney injury superimposed on CKD ([facility]    Hepatic cirrhosis due to hepatitis B ([facility]    Ascites    Pneumonia due to organism    [facility]    History of endocarditis    History of seizure    Tobacco dependence    Myalgia    Metabolic acidosis    Thrombocytopenia (HCC)    Petechial rash    Anemia    Problem with vascular access    Hypocalcemia    Elevated d-dimer    Valvular heart disease    Hypotension    Left arm weakness        PAST MEDICAL HISTORY:    Past Medical History:   Diagnosis Date    Bacterial endocarditis 2/2013    CAD (coronary artery disease)     Hepatic cirrhosis due to hepatitis B ([facility] 1/5/2016    [facility]     clean since 2012    MI (myocardial infarction) ([facility]     Seizures ([facility]     Substance abuse ([facility]     Unspecified cerebral artery occlusion with cerebral infarction        DIET:    DIET GENERAL;     PHYSICAL EXAM:     Patient Vitals for the past 24 hrs:   BP Temp Temp src Pulse Resp SpO2   04/23/21 0815 (!) 110/58 97.8 °F (36.6 °C) Oral 85 22 --   04/23/21 0300 112/60 98.5 °F (36.9 °C) Temporal 83 16 95 %   04/22/21 2319 110/69 98 °F (36.7 °C) Temporal 81 16 96 %   04/22/21 1745 80/68 97.9 °F (36.6 °C) Temporal 90 16 97 %   04/22/21 1208 (!) 119/58 98.5 °F (36.9 °C) Temporal 80 16 95 %   @      Intake/Output Summary (Last 24 hours) at 4/23/2021 1117  Last data filed at 4/23/2021 1108  Gross per 24 hour   Intake 2603 ml   Output 1260 ml   Net 1343 ml         Wt Readings from Last 3 Encounters:   04/20/21 202 lb 12.8 oz (92 kg)   01/17/17 240 lb (108.9 kg)   06/17/16 177 lb (80.3 kg)       Constitutional:  Pt is in no acute distress  Head: normocephalic, atraumatic  Neck: no JVD  Cardiovascular: regular rate and rhythm, no murmurs, gallops, or rubs  Respiratory:  No rales, rhochi, or wheezes  Gastrointestinal:  Soft, nontender, nondistended, bowel sounds x 4  Ext: no edema  Skin: dry, no rash  Neuro: aaox3    MEDS (scheduled):    guaiFENesin  400 mg Oral TID    epoetin kranthi-epbx  3,000 Units Subcutaneous Once per day on Mon Wed Fri    daptomycin (CUBICIN) IVPB  6 mg/kg (Ideal) Intravenous Q24H    lidocaine PF  5 mL Intradermal Once    sodium chloride flush  5-40 mL Intravenous 2 times per day    sodium bicarbonate  1,300 mg Oral BID    sodium chloride flush  5-40 mL Intravenous 2 times per day    cefTRIAXone (ROCEPHIN) IV  1,000 mg Intravenous Q24H       MEDS (infusions):   sodium chloride      sodium chloride 125 mL/hr at 04/23/21 0427       MEDS (prn):  hydrOXYzine, LORazepam, sodium chloride flush, sodium chloride, perflutren lipid microspheres, acetaminophen, sodium chloride flush    DATA:    Recent Labs     04/21/21  0349 04/22/21 0442 04/23/21  0423   WBC 14.3* 13.0* 14.2*   HGB 7.0* 7.0* 7.0*   HCT 21.6* 21.4* 22.5*   MCV 86.1 88.1 91.5    141 165     Recent Labs     04/20/21  1606 04/21/21  0349 04/22/21  0442 04/23/21  0423    131* 131*  133 130*  129*   K 4.2 3.8 3.9  3.9 3.9  3.9    100 102  103 102  100   CO2 11* 15* 13*  14* 12*  12*   BUN 80* 95* 85*  87* 81*  79*   CREATININE 5.6* 6.5* 5.8*  5.9* 5.3*  5.3*   LABGLOM 11 9 11  10 12  12   GLUCOSE 98 113* 108*  109* 101*  99   CALCIUM 7.2* 8.5* 8.8  8.8 8.4*  8.4*   ALT 10  --   --   --    AST 19  --   --   --    BILITOT 0.5  --   --   --    ALKPHOS 118  --   --   --    MG  --   --  1.4* 1.4*   PHOS  --  6.5* 6.3* 6.4*       Lab Results   Component Value Date    LABALBU 2.4 (L) 04/23/2021    LABALBU 2.7 (L) 04/22/2021    LABALBU 2.8 (L) 04/21/2021     Lab Results   Component Value Date    TSH 0.212 (L) 01/19/2014       Iron Studies  Lab Results   Component Value Date    IRON 39 (L) 04/22/2021    TIBC 163 (L) 04/22/2021    FERRITIN 1,015 04/22/2021     Vitamin B-12   Date Value Ref Range Status   04/21/2021 629 211 - 946 pg/mL Final     Folate   Date Value Ref Range Status   04/21/2021 5.1 4.8 - 24.2 ng/mL Final No results found for: VITD25  No results found for: PTH    No components found for: URIC    Lab Results   Component Value Date    COLORU Yellow 01/19/2016    NITRU Negative 01/19/2016    GLUCOSEU Negative 01/19/2016    KETUA Negative 01/19/2016    UROBILINOGEN 0.2 01/19/2016    BILIRUBINUR Negative 01/19/2016       No results found for: [facility]      IMPRESSION/RECOMMENDATIONS:      1.  Acute on chronic kidney disease  Baseline serum creatinine ranging 1.3 -> 1.5 since November 2014  Most recent serum creatinine 1.0 on 1/17/17  Now admitted with serum creatinine 5.6 in the setting of SOB w/ CP  CT /21/21: no evidence of hydronephrosis as well as no evidence of renal or ureteral calculus  Strict intake and output  fena <1  Avoid NSAIDs  Continue ivf  Cr 6.1>6.9>2.5>7.5    2.    Metabolic acidosis  Secondary to severe dehydration and renal failure  Add hco3 to ivf     3.   SOB  With elevated D-dimer  Also started on IV antibiotics for pneumonia     4.    Thrombocytopenia  In a patient with liver disease  Petechial rash noted  Further work-up per the primary team     5.   Anemia  Follow closely  Transfuse as needed for hemoglobin less than 7.0  Start alia     6. 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 Hyponatremia  In setting of arf  Check urine lon Tim.  Saeed Ramirez MD

## 2021-04-23 NOTE — CONSULTS
or lower  extremities. 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 I doubt it is  subarachnoid hemorrhage. Multiple medical comorbidities. We will await  the completion of MRI scan and after reviewing that we will make further  recommendations.         Yusef Mishra MD    D: 04/23/2021 10:31:05       T: 04/23/2021 10:41:58     [email or web address]  Job#: [id number]     Doc#: [id number]    CC:

## 2021-04-23 NOTE — PROCEDURES
Informed Dr Sean Molina yesterday with no reply, and informed Trell Soni today, awaiting a response. . MRI'S ARE STILL ON A LONG DELAY. The patient has a pacer and valve replacement from [facility], which Rn had to send a release of records for as patient does not provide any info on the implants at all. being that it is Friday- & we still do not have records, IF CLEAR, we would then need cardiologist clearance, and then the MRI would likely not even be done/scheduled until late Monday into Tuesday.  Will await records, RN has a request to [address] [place] [address] as of yesterday

## 2021-04-23 NOTE — PLAN OF CARE
Plan of care    Patient in need of MRI brain and MRA head/neck due to recurrent thromboembolic event. Repeat CTH will be ordered in the meantime but Neurology would like patient to have MRI/MRAs completed. Discussed with Dr. Tracey Martinez.

## 2021-04-23 NOTE — CARE COORDINATION
Patient does not have PCP, I did ask if he would be willing to keep appointment if I make arrangements and he is in agreement with this. New PCP arranged with NP Meryle Hurt, April 30th 9:00am, added to AVS. Currently MRI brain, head and neck pending, ALANIS pending. Currently on rocephin and Q24 dapto, concern for possible endocarditis. Referral to [facility] in the event he will need IVs when released. They will not have availability until early next week. Patient will not need homecare unless IVs are needed. If IVs needed he will need following physician for homecare until new PCP can be established. He is currently ambulatory to the bathroom unassisted. Plan is home when released. For questions I can be reached at [phone].  Marito Sahu [place]

## 2021-04-24 NOTE — PROGRESS NOTES
Negative 01/19/2016    KETUA Negative 01/19/2016    UROBILINOGEN 0.2 01/19/2016    BILIRUBINUR Negative 01/19/2016       No results found for: Anders Bryan      IMPRESSION/RECOMMENDATIONS:      1.  Acute on chronic kidney disease  Baseline serum creatinine ranging 1.3 -> 1.5 since November 2014  Most recent serum creatinine 1.0 on 1/17/17  Now admitted with serum creatinine 5.6 in the setting of SOB w/ CP  CT /21/21: no evidence of hydronephrosis as well as no evidence of renal or ureteral calculus  Strict intake and output  fena <1  Cr trending down; UO acceptable  Continue ivf    2.    Metabolic acidosis  Secondary to severe dehydration and renal failure  Will continue bicarb drip     3.   SOB  With elevated D-dimer  Also started on IV antibiotics for pneumonia      4.   Anemia  Follow closely  Transfuse as needed for hemoglobin less than 7.0  Start alia     5. 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  Hyponatremia  Now resolved with IVF  Check urine lon Polo MD

## 2021-04-24 NOTE — PROGRESS NOTES
At approximately 1800, patient's family asked this RN to assist patient to use urinal as he had to go to the bathroom and was trying to get out of bed. Family left the room as this RN assisted patient. At that time, patient stopped breathing, continued to have pulse, unresponsive to stimuli. [employer] called.

## 2021-04-24 NOTE — PROGRESS NOTES
Department of Internal Medicine  Infectious Diseases  Progress Note      C/C :  Strep bacteremia , CAP     Pt is lethargic. Has shallow breathing    Afebrile       Current Facility-Administered Medications   Medication Dose Route Frequency Provider Last Rate Last Admin    0.9 % sodium chloride infusion   Intravenous PRN Mary Jane Jean MD        LORazepam (ATIVAN) tablet 1 mg  1 mg Oral Q4H PRN Mary Jane Jean MD        cefTRIAXone (ROCEPHIN) 2,000 mg in sterile water 20 mL IV syringe  2,000 mg Intravenous Q24H Liliana Cueva MD        hydrOXYzine (VISTARIL) capsule 25 mg  25 mg Oral TID PRN Mary Jane Jean MD   25 mg at 04/23/21 1720    guaiFENesin tablet 400 mg  400 mg Oral TID Mary Jane Jean MD   400 mg at 04/24/21 0917    sodium bicarbonate 75 mEq in sodium chloride 0.45 % 1,000 mL infusion   Intravenous Continuous Julissa Bruce  mL/hr at 04/24/21 0145 New Bag at 04/24/21 0145    epoetin kranthi-epbx (RETACRIT) injection 3,000 Units  3,000 Units Subcutaneous Once per day on Mon Wed Fri Julissa Bruce MD   3,000 Units at 04/23/21 0827    lidocaine PF 1 % injection 5 mL  5 mL Intradermal Once Shalom Velazco MD        sodium chloride flush 0.9 % injection 5-40 mL  5-40 mL Intravenous 2 times per day Shalom Velazco MD   10 mL at 04/23/21 0827    sodium chloride flush 0.9 % injection 5-40 mL  5-40 mL Intravenous PRN Shalom Velazco MD        0.9 % sodium chloride infusion  25 mL Intravenous PRN Shalom Velazco MD        perflutren lipid microspheres (DEFINITY) injection 1.65 mg  1.5 mL Intravenous ONCE PRN Buddy Andino MD        sodium bicarbonate tablet 1,300 mg  1,300 mg Oral BID JESSICA Barahona - CNP   1,300 mg at 04/24/21 5400    acetaminophen (TYLENOL) tablet 650 mg  650 mg Oral Q6H PRN Denis Marie MD   650 mg at 04/21/21 1936             REVIEW OF SYSTEMS:    CONSTITUTIONAL:  Denies fever, chill or rigors.   HEENT: denies blurring of vision or double vision, denies hearing problem  RESPIRATORY: SOB   CARDIOVASCULAR:  Denies palpitation  GASTROINTESTINAL:  Denies abdomen pain, diarrhea or constipation. GENITOURINARY:  Denies burning urination or frequency of urination  INTEGUMENT: denies wound , rash  HEMATOLOGIC/LYMPHATIC:  Denies lymph node swelling, gum bleeding or easy bruising. MUSCULOSKELETAL:  Denies leg pain , joint pain , joint swelling  NEUROLOGICAL:  Weakness, passed out       PHYSICAL EXAM:      Vitals:     Vitals:    04/24/21 0745   BP: (!) 113/92   Pulse: 137   Resp: 28   Temp: 97.8 °F (36.6 °C)   SpO2:        General Appearance:    lethargic, fast shallow respirations. Head:    Normocephalic, atraumatic   Eyes:    No pallor, no icterus,   Ears:    No obvious deformity or drainage.    Nose:   No nasal drainage   Throat:   Mucosa moist, no oral thrush   Neck:   Supple, no lymphadenopathy   Lungs:     Crackles at the bases     Heart:    Regular rate and rhythm, no murmur   Abdomen:     Soft, non-tender, bowel sounds present    Extremities:   No edema, no cyanosis   Pulses:   Dorsalis pedis palpable    Skin:   no rashes or lesions         CBC with Differential:      Lab Results   Component Value Date    WBC 14.8 04/24/2021    RBC 2.28 04/24/2021    HGB 6.5 04/24/2021    HCT 20.2 04/24/2021     04/24/2021    MCV 88.6 04/24/2021    MCH 28.5 04/24/2021    MCHC 32.2 04/24/2021    RDW 16.0 04/24/2021    SEGSPCT 68 02/04/2014    BANDSPCT 1 05/06/2016    LYMPHOPCT 9.0 04/24/2021    MONOPCT 7.4 04/24/2021    BASOPCT 0.5 04/24/2021    MONOSABS 1.10 04/24/2021    LYMPHSABS 1.33 04/24/2021    EOSABS 0.25 04/24/2021    BASOSABS 0.07 04/24/2021       CMP     Lab Results   Component Value Date     04/24/2021     04/24/2021    K 4.0 04/24/2021    K 4.1 04/24/2021     04/24/2021     04/24/2021    CO2 16 04/24/2021    CO2 16 04/24/2021    BUN 75 04/24/2021    BUN 75 04/24/2021    CREATININE 4.8 04/24/2021    CREATININE 4.8 04/24/2021    GFRAA 16 Candida albicans by PCR Not Detected    Candida glabrata by PCR Not Detected    Candida krusei by PCR Not Detected    Candida parapsilosis by PCR Not Detected    Candida tropicalis by PCR Not Detected     Enterobacteriaceae by PCR Not Detected    Enterococcus by PCR Not Detected    Haemophilus Influenzae by PCR Not Detected    Listeria monocytogenes by PCR Not Detected    Neisseria meningitidis by PCR Not Detected    Pseudomonas aeruginosa by PCR Not Detected    Staphylococcus species by PCR Not Detected    Streptococcus species by PCR DETECTEDPanic     Narrative:             Radiology :    Chest X ray    CTA scan of abdomen and pelvis -    Limited evaluation without IV contrast.     Gallstone in the gallbladder with pericholecystic fat stranding and   gallbladder wall thickening may indicate acute cholecystitis.  Other etiology   may include edema or infiltrative hepatic disease.  Please correlate physical   exam.  Further evaluation may be obtained with HIDA scan there is concern   cholecystitis. No renal or ureteral calculus. IMPRESSION:       1. Streptococcus mitis/oralis and salivarius bacteremia - high risk of endocarditis   (  Hx  of IVIDU ( 7 yrs ago ) , hx of recurrent MRSA endocarditis s/p MVR X 3  ( bioprosthetic ) ( 3/2013 , 7/2013, 4/2014 ) , permanent pacer insertion ( 5/2014 ))   2. Leukocytosis   3. CAP       RECOMMENDATIONS:      1. Rocephin 1 gram IV q 24 hrs  2. Stop daptomycin   3. ALANIS   4. Follow up blood cx   5. Repeat blood cx today.

## 2021-04-24 NOTE — PROGRESS NOTES
Hospitalist Progress Note      PCP: Sabi Everett DO    Date of Admission: 4/20/2021  Days in the hospital: [address] Course:   25-year-old male patient with multiple medical problems including history of substance abuse, hepatitis B, liver cirrhosis, MRSA endocarditis, history of CVA, CAD who comes in with vague symptoms including some shortness of breath, nausea and vomiting and generalized feeling of not feeling well. He was noted to have pneumonia and sepsis at the time of admission started on empiric antibiotics. Chest x-ray did show right middle and lower lung consolidation. Patient also was noted to have acute kidney injury with creatinine of 6.5. Overnight patient had some left-sided weakness noted and CT of the head was done which showed left parietal lobe tiny subarachnoid hemorrhage. Consultation was placed to neurology. Patient also was noted to have pancytopenia. Consultation was placed to neurosurgery and plan is for patient to have MRI.       Subjective  Pt was sob so overnight received breathing treatment  When I discussed with pt he states he gets worked up because he doesn't like having to deal with people and that being in the hospital makes this worse    Exam:    BP (!) 113/92   Pulse 137   Temp 97.8 °F (36.6 °C) (Oral)   Resp 28   Ht 6' 3\" (1.905 m)   Wt 202 lb 12.8 oz (92 kg)   SpO2 99%   BMI 25.35 kg/m²     HEENT: + Pallor, no icterus. Irritable   Respiratory:  CTA, good air entry. Cardiovascular: Tachycardic, no murmur  Abdomen: Soft, non-tender, BS noted. Musculoskeletal: No joint pains or joint swelling noted.    Neurologic: awake, alert and following commands       Assessment/Plan:  Active Hospital Problems    Diagnosis Date Noted    Polysubstance abuse [facility]) [F19.10] 04/21/2021    History of endocarditis [Z86.79] 04/21/2021    History of seizure [Z87.898] 04/21/2021    Tobacco dependence [F17.200] 04/21/2021    Myalgia [M79.10] 14/01/6521    Metabolic acidosis [E87.2] 04/21/2021    Thrombocytopenia (HCC) [D69.6] 04/21/2021    Petechial rash [R23.3] 04/21/2021    Anemia [D64.9] 04/21/2021    Problem with vascular access [Z78.9] 04/21/2021    Hypocalcemia [E83.51] 04/21/2021    Elevated d-dimer [R79.89] 04/21/2021    Valvular heart disease [I38] 04/21/2021    Hypotension [I95.9] 04/21/2021    Left arm weakness [R29.898] 04/21/2021    Pneumonia due to organism [J18.9] 04/20/2021    Hepatic cirrhosis due to hepatitis B ([facility] [address] [K74.60, B19.10] 01/05/2016    Acute kidney injury superimposed on CKD ([facility] [address] [N17.9, N18.9] 04/19/2014    [facility] [A41.9] 04/19/2014     · Subarachnoid hemorrhage  · Streptococcis mitis/oralis bacteremia      Plan:  ? Patient noted to have gram-positive cocci in chains bacteremia, currently on Rocephin, will consult ID  ? Renal function slowly improving, follow-up with nephrology, patient having urine output, continue to monitor closely  ? Appreciate neurology and neurosurgical support   ? Acute on chronic anemia, monitor him, transfuse as indicated  ? ALANIS ordered and pending  ? Pt does not have insight as far as severity of his illness   ? Overall prognosis remains guarded  ? CXR ordered pending   ? Discussed pt with Dr. George Remedies:   Recent Labs     04/22/21 0442 04/23/21 0423 04/24/21  0440   WBC 13.0* 14.2* 14.8*   HGB 7.0* 7.0* 6.5*   HCT 21.4* 22.5* 20.2*    165 169     Recent Labs     04/22/21  0442 04/23/21  0423 04/24/21  0440   *  133 130*  129* 133  132   K 3.9  3.9 3.9  3.9 4.1  4.0     103 102  100 101  100   CO2 13*  14* 12*  12* 16*  16*   BUN 85*  87* 81*  79* 75*  75*   CREATININE 5.8*  5.9* 5.3*  5.3* 4.8*  4.8*   CALCIUM 8.8  8.8 8.4*  8.4* 8.4*  8.5*   PHOS 6.3* 6.4* 6.3*     No results for input(s): AST, ALT, BILIDIR, BILITOT, ALKPHOS in the last 72 hours. No results for input(s): INR in the last 72 hours.   Recent Labs     04/23/21  2308 04/24/21  0440 04/24/21  1020   TROPONINI 0.06* 0.06* 0.07*       Medications:  Reviewed    Infusion Medications    sodium chloride      IV infusion builder 100 mL/hr at 04/24/21 0145    sodium chloride       Scheduled Medications    cefTRIAXone (ROCEPHIN) IV  2,000 mg Intravenous Q24H    LORazepam  1 mg Intravenous Once    guaiFENesin  400 mg Oral TID    epoetin kranthi-epbx  3,000 Units Subcutaneous Once per day on Mon Wed Fri    lidocaine PF  5 mL Intradermal Once    sodium chloride flush  5-40 mL Intravenous 2 times per day    sodium bicarbonate  1,300 mg Oral BID     PRN Meds: sodium chloride, LORazepam, hydrOXYzine, sodium chloride flush, sodium chloride, perflutren lipid microspheres, acetaminophen      Intake/Output Summary (Last 24 hours) at 4/24/2021 1406  Last data filed at 4/24/2021 0810  Gross per 24 hour   Intake 700 ml   Output 790 ml   Net -90 ml     Body mass index is 25.35 kg/m². · Diet  DIET GENERAL;  Diet NPO, After Midnight    · Code Status  Prior       Electronically signed by Justine Solano MD on 4/24/2021 at 2:06 PM  [employer]   Please contact me through perfect serve    NOTE: This report was transcribed using voice recognition software. Every effort was made to ensure accuracy; however, inadvertent computerized transcription errors may be present.

## 2021-04-24 NOTE — PROGRESS NOTES
Patient getting out of bed despite being instructed he cannot and being put on the bedpan, difficulty returning to bed and asking for assistance. Took heart monitor off despite instruction not too and heart rate being in the 120s. Lung sounds clear/diminished. Patient requesting nasal cannula but repeatedly takes off despite instruction not to, pulse oxygen %. Patient not receptive to education at this time. Family at bedside assisting patient out of bed.

## 2021-04-24 NOTE — PLAN OF CARE
Plan of care    Patient was sitting up on the side of the bed appears to be short of breath. Patient states that he does not feel well. NC was reapplied. Advised repeat CT head showed some edema surrounding his bleed which was a little worse than previous CT of head. He has no neurological complaints at this time. Discussed and reviewed CT of head with Dr. Chacho Worthy and neurology believes these are new strokes on his original CT of head and repeat CT of head which are likely due to his possible endocarditis. No recommended AP at this time.      Neurology will follow peripherally

## 2021-04-24 NOTE — CODE DOCUMENTATION
Code blue note    Code daryl was called at 1823    On arrival, pt was 1824    ACLS protocol was initiated and the following was given:  Adrenaline: 6  Atropine:  1  Calcium gluconate: 0  Sodium bicarbonate: 2    Patient intubated and placed on ventilator. Central line attempt to be inserted per team members. ABG obtained revealed:               pH, Blood Gas 7.410    PCO2 23.1Low  mmHg   PO2 41.9Low Panic   mmHg   HCO3 14.3Low  mmol/L      We continued CPR for 20, and pt was revived and transferred to the ICU. Epi gtt started at 30 mcg/min, family changed code status to DNR CCA. However he lost pulse again later, patient was pronounced dead at 19:19 PM eventually     Family, PCP and Intensives were updated.       Jaylen Davison MD  Internal Medicine resident  4/24/2021  7:06 PM

## 2021-04-25 NOTE — PROGRESS NOTES
Per pt daughter, Corrinne Brim, pt stated he wanted to be cremated. They are unaware of which facility to use. She stated pt sister George Green will be calling to make arrangements, but they did not have a number to reach her. Pt will be moved to the [facility] for the time being. Shalini's telephone number is [phone].

## 2021-04-25 NOTE — CODE DOCUMENTATION
Spoke to families of Mr. George Sidhu, informed that the lady earlier today was not the patient's daughter. During the lengthy talking, explained to the families what happened during the code blue. The families was claiming that the lady claimed to be the daughter and POA to all medical stuff, Fili Flowers, [phone](according to the chart) acctually was just the patient's friend. Also the one middle aged lady with her claimed to be the patient's mom was also non-related. After about 20 mins CPR, ACLS protocol achieved and epinephrine drip initiated for persistent hypotension. Discussed with Fili Flowers who claimed to be the patient's daughter but actually not, they decided to change code status to DNR CCA before transfer to MICU. After patient arrival to MICU, the patient lost the pulse again shortly and he passed eventually. Aparantly, the true families are very angry and upset, they mentioned not informed of anything of the patient's this hospitalization until they found out the patient's death on patient's media websites. All questions answered and they require a detailed documentation of what happened during code blue.

## 2021-04-25 NOTE — PROGRESS NOTES
Notified pt care team of expiration: Infectious Disease (Dr. Saulo aWll) via answering service, Neurosurgery (Dr. Mohsen Eller) via telephone, Nephrology (Dr. Jace Dodd) and Neurology (Dr. Peter Ma) via perfect serve. Dr. Arleth Gonzalez also notified for discharge order, as well as for her to relay that Dr. Natalie Felton needs to sign the death certificate.

## 2021-04-25 NOTE — PROGRESS NOTES
Pt mother Jerry Dowling ([phone]) called, stated she had no idea her son had passed away, and states \"Those people who came to the hospital are NOT his family. \" Will relay this to the clinical nurse supervisor. Erin Gricelda is requesting we do not release the body until she can get here to see him, which will most likely be tomorrow (Sunday 4/25/21).

## 2021-04-25 NOTE — DEATH NOTES
Death Pronouncement Note  Patient's Name: Nadya Malave   Patient's YOB: 1975  MRN Number: [id number]    Admitting Provider: Angus Penny MD  Attending Provider: Rudy Burgess MD    Patient was examined and the following were absent: Pulses, Blood Pressure and Respiratory effort    I declared the patient dead on  at 19:19 PM 4/24/2021    Preliminary Cause of Death: cardiac arrest     Electronically signed by Susana Meléndez MD on 4/24/21 at 8:32 PM EDT

## 2021-05-10 NOTE — DISCHARGE SUMMARY
Hospital Medicine Discharge Summary    Patient ID: Phoenix Castle      Patient's PCP: Re Vincent DO    Admitting Physician: Janet Ann MD  Discharge Physician: Ankita Fall MD     Admit Date: 2021     Disposition:     Discharge Diagnoses: Active Problems:    Sepsis ([facility] [address]    Acute kidney injury superimposed on CKD (HCC)    Hepatic cirrhosis due to hepatitis B ([address]    Pneumonia due to organism    Polysubstance abuse ([address]    History of endocarditis    History of seizure    Tobacco dependence    Myalgia    Metabolic acidosis    Thrombocytopenia (HCC)    Petechial rash    Anemia    Problem with vascular access    Hypocalcemia    Elevated d-dimer    Valvular heart disease    Hypotension    Left arm weakness  Resolved Problems:    * No resolved hospital problems. *      Date of Death:21  Time of Death:    Immediate Cause of Death:Acute respiratory failure  Underlying Conditions: substance abuse, MRSA endocarditis, CAD, [facility]      Hospital Course:   48-year-old male patient with multiple medical problems including history of substance abuse, hepatitis B, liver cirrhosis, MRSA endocarditis, history of CVA, CAD who comes in with vague symptoms including some shortness of breath, nausea and vomiting and generalized feeling of not feeling well.  He was noted to have pneumonia and sepsis at the time of admission started on empiric antibiotics.  Chest x-ray did show right middle and lower lung consolidation.  Patient also was noted to have acute kidney injury with creatinine of 6.5.  Overnight patient had some left-sided weakness noted and CT of the head was done which showed left parietal lobe tiny subarachnoid hemorrhage.  Consultation was placed to neurology. Wayne Sesay also was noted to have pancytopenia.   Consultation was placed to neurosurgery and the plan was for patient to have MRI    On afternoon of  patient had visitors and patient was acting erratically, not following directions, removed heart monitor and oxygen. Requested assistance to go to bathroom and respiratory arrested followed by cardiac arrest. Underwent CPR for 20 minutes and got ROSC and transferred to MICU on epinephrine drip. Per code documentation, patient's family then changed patient code status to [facility]. Pulse lost again later and patient pronounced dead at 1919. Consults:  IP CONSULT TO INTERNAL MEDICINE  IP CONSULT TO NEPHROLOGY  IP CONSULT TO NEUROLOGY  IP CONSULT TO NEUROSURGERY  IP CONSULT TO INFECTIOUS DISEASES    Signed:  Angie Hamilton MD   5/10/2021    Thank you Oksana Spence DO for the opportunity to be involved in this patient's care. If you have any questions or concerns please feel free to contact me at [phone].
